# Patient Record
Sex: FEMALE | Race: WHITE | NOT HISPANIC OR LATINO | Employment: OTHER | URBAN - METROPOLITAN AREA
[De-identification: names, ages, dates, MRNs, and addresses within clinical notes are randomized per-mention and may not be internally consistent; named-entity substitution may affect disease eponyms.]

---

## 2023-02-11 PROBLEM — H65.03 ACUTE SEROUS OTITIS MEDIA OF BOTH EARS: Status: ACTIVE | Noted: 2023-02-11

## 2023-02-11 PROBLEM — M54.16 LUMBAR RADICULOPATHY: Status: ACTIVE | Noted: 2023-02-11

## 2023-02-11 PROBLEM — N32.9 BLADDER PROBLEM: Status: ACTIVE | Noted: 2023-02-11

## 2023-02-11 PROBLEM — R91.8 LUNG MASS: Status: ACTIVE | Noted: 2023-02-11

## 2023-02-11 PROBLEM — Z96.659 POSTOPERATIVE STIFFNESS OF TOTAL KNEE REPLACEMENT (CMS-HCC): Status: ACTIVE | Noted: 2023-02-11

## 2023-02-11 PROBLEM — J20.9 ACUTE BRONCHITIS: Status: ACTIVE | Noted: 2023-02-11

## 2023-02-11 PROBLEM — J30.9 ALLERGIC RHINITIS: Status: ACTIVE | Noted: 2023-02-11

## 2023-02-11 PROBLEM — M54.12 CERVICAL NEURITIS: Status: ACTIVE | Noted: 2023-02-11

## 2023-02-11 PROBLEM — M25.473 ANKLE EDEMA: Status: ACTIVE | Noted: 2023-02-11

## 2023-02-11 PROBLEM — M48.00 SPINAL STENOSIS: Status: ACTIVE | Noted: 2023-02-11

## 2023-02-11 PROBLEM — J06.9 ACUTE UPPER RESPIRATORY INFECTION: Status: ACTIVE | Noted: 2023-02-11

## 2023-02-11 PROBLEM — R09.81 NASAL CONGESTION: Status: ACTIVE | Noted: 2023-02-11

## 2023-02-11 PROBLEM — E06.9 THYROIDITIS: Status: ACTIVE | Noted: 2023-02-11

## 2023-02-11 PROBLEM — M25.669 POSTOPERATIVE STIFFNESS OF TOTAL KNEE REPLACEMENT (CMS-HCC): Status: ACTIVE | Noted: 2023-02-11

## 2023-02-11 PROBLEM — M71.20 BAKER'S CYST OF KNEE: Status: ACTIVE | Noted: 2023-02-11

## 2023-02-11 PROBLEM — J32.9 CHRONIC SINUSITIS: Status: ACTIVE | Noted: 2023-02-11

## 2023-02-11 PROBLEM — M85.80 OSTEOPENIA: Status: ACTIVE | Noted: 2023-02-11

## 2023-02-11 PROBLEM — J32.4 CHRONIC PANSINUSITIS: Status: ACTIVE | Noted: 2023-02-11

## 2023-02-11 PROBLEM — R19.5 LOOSE STOOLS: Status: ACTIVE | Noted: 2023-02-11

## 2023-02-11 PROBLEM — K59.09 CHRONIC CONSTIPATION: Status: ACTIVE | Noted: 2023-02-11

## 2023-02-11 PROBLEM — K64.4 EXTERNAL HEMORRHOIDS: Status: ACTIVE | Noted: 2023-02-11

## 2023-02-11 PROBLEM — R19.4 CHANGE IN BOWEL HABIT: Status: ACTIVE | Noted: 2023-02-11

## 2023-02-11 PROBLEM — M19.90 ARTHRITIS: Status: ACTIVE | Noted: 2023-02-11

## 2023-02-11 PROBLEM — L03.115 BILATERAL LOWER LEG CELLULITIS: Status: ACTIVE | Noted: 2023-02-11

## 2023-02-11 PROBLEM — R10.9 ABDOMINAL PAIN: Status: ACTIVE | Noted: 2023-02-11

## 2023-02-11 PROBLEM — K52.9 CHRONIC DIARRHEA: Status: ACTIVE | Noted: 2023-02-11

## 2023-02-11 PROBLEM — M48.02 CERVICAL STENOSIS OF SPINE: Status: ACTIVE | Noted: 2023-02-11

## 2023-02-11 PROBLEM — M77.12 EPICONDYLITIS, LATERAL, LEFT: Status: ACTIVE | Noted: 2023-02-11

## 2023-02-11 PROBLEM — S16.1XXA CERVICAL STRAIN: Status: ACTIVE | Noted: 2023-02-11

## 2023-02-11 PROBLEM — J31.0 CHRONIC RHINITIS: Status: ACTIVE | Noted: 2023-02-11

## 2023-02-11 PROBLEM — M77.8 DELTOID TENDINITIS, RIGHT: Status: ACTIVE | Noted: 2023-02-11

## 2023-02-11 PROBLEM — R35.0 INCREASED URINARY FREQUENCY: Status: ACTIVE | Noted: 2023-02-11

## 2023-02-11 PROBLEM — M54.9 BACK ARTHRALGIA: Status: ACTIVE | Noted: 2023-02-11

## 2023-02-11 PROBLEM — M77.8 SHOULDER CAPSULITIS, LEFT: Status: ACTIVE | Noted: 2023-02-11

## 2023-02-11 PROBLEM — R00.2 PALPITATIONS: Status: ACTIVE | Noted: 2023-02-11

## 2023-02-11 PROBLEM — D75.839 THROMBOCYTOSIS: Status: ACTIVE | Noted: 2023-02-11

## 2023-02-11 PROBLEM — R25.2 BILATERAL LEG CRAMPS: Status: ACTIVE | Noted: 2023-02-11

## 2023-02-11 PROBLEM — M96.1 POSTLAMINECTOMY SYNDROME, LUMBAR: Status: ACTIVE | Noted: 2023-02-11

## 2023-02-11 PROBLEM — M77.42 METATARSALGIA OF LEFT FOOT: Status: ACTIVE | Noted: 2023-02-11

## 2023-02-11 PROBLEM — S29.019A THORACIC MYOFASCIAL STRAIN: Status: ACTIVE | Noted: 2023-02-11

## 2023-02-11 PROBLEM — S80.11XA HEMATOMA OF RIGHT LOWER LEG: Status: ACTIVE | Noted: 2023-02-11

## 2023-02-11 PROBLEM — M54.50 LOW BACK PAIN: Status: ACTIVE | Noted: 2023-02-11

## 2023-02-11 PROBLEM — E66.9 OBESITY: Status: ACTIVE | Noted: 2023-02-11

## 2023-02-11 PROBLEM — J45.991 COUGH VARIANT ASTHMA (HHS-HCC): Status: ACTIVE | Noted: 2023-02-11

## 2023-02-11 PROBLEM — M47.22 OSTEOARTHRITIS OF SPINE WITH RADICULOPATHY, CERVICAL REGION: Status: ACTIVE | Noted: 2023-02-11

## 2023-02-11 PROBLEM — K52.9 GASTROENTERITIS: Status: ACTIVE | Noted: 2023-02-11

## 2023-02-11 PROBLEM — J41.0 SIMPLE CHRONIC BRONCHITIS (MULTI): Status: ACTIVE | Noted: 2023-02-11

## 2023-02-11 PROBLEM — M48.07 LUMBOSACRAL STENOSIS: Status: ACTIVE | Noted: 2023-02-11

## 2023-02-11 PROBLEM — R51.9 HEADACHE: Status: ACTIVE | Noted: 2023-02-11

## 2023-02-11 PROBLEM — J42 CHRONIC BRONCHITIS (MULTI): Status: ACTIVE | Noted: 2023-02-11

## 2023-02-11 PROBLEM — M54.12 CERVICAL RADICULOPATHY AT C6: Status: ACTIVE | Noted: 2023-02-11

## 2023-02-11 PROBLEM — M43.10 DEGENERATIVE SPONDYLOLISTHESIS: Status: ACTIVE | Noted: 2023-02-11

## 2023-02-11 PROBLEM — N64.89 BREAST ASYMMETRY IN FEMALE: Status: ACTIVE | Noted: 2023-02-11

## 2023-02-11 PROBLEM — M48.062 LUMBAR STENOSIS WITH NEUROGENIC CLAUDICATION: Status: ACTIVE | Noted: 2023-02-11

## 2023-02-11 PROBLEM — R19.8 ALTERNATING CONSTIPATION AND DIARRHEA: Status: ACTIVE | Noted: 2023-02-11

## 2023-02-11 PROBLEM — J45.909 ASTHMATIC BRONCHITIS (HHS-HCC): Status: ACTIVE | Noted: 2023-02-11

## 2023-02-11 PROBLEM — M19.012 PRIMARY OSTEOARTHRITIS OF LEFT SHOULDER: Status: ACTIVE | Noted: 2023-02-11

## 2023-02-11 PROBLEM — L03.116 BILATERAL LOWER LEG CELLULITIS: Status: ACTIVE | Noted: 2023-02-11

## 2023-02-11 PROBLEM — R04.0 EPISTAXIS: Status: ACTIVE | Noted: 2023-02-11

## 2023-02-11 PROBLEM — M19.011 DJD OF RIGHT SHOULDER: Status: ACTIVE | Noted: 2023-02-11

## 2023-02-11 PROBLEM — R60.0 EDEMA OF LOWER EXTREMITY: Status: ACTIVE | Noted: 2023-02-11

## 2023-02-11 PROBLEM — S70.01XA CONTUSION OF RIGHT HIP: Status: ACTIVE | Noted: 2023-02-11

## 2023-02-11 PROBLEM — M13.0 POLYARTHRITIS: Status: ACTIVE | Noted: 2023-02-11

## 2023-02-11 PROBLEM — D49.89 NEOPLASM OF BACK: Status: ACTIVE | Noted: 2023-02-11

## 2023-02-11 PROBLEM — N32.81 OVERACTIVE BLADDER: Status: ACTIVE | Noted: 2023-02-11

## 2023-02-11 PROBLEM — M94.0 COSTOCHONDRITIS: Status: ACTIVE | Noted: 2023-02-11

## 2023-02-11 PROBLEM — R53.83 FATIGUE: Status: ACTIVE | Noted: 2023-02-11

## 2023-02-11 PROBLEM — R93.89 ABNORMAL CHEST XRAY: Status: ACTIVE | Noted: 2023-02-11

## 2023-02-11 PROBLEM — G95.9 CERVICAL MYELOPATHY (MULTI): Status: ACTIVE | Noted: 2023-02-11

## 2023-02-11 PROBLEM — J45.909 ALLERGIC BRONCHITIS (HHS-HCC): Status: ACTIVE | Noted: 2023-02-11

## 2023-02-11 PROBLEM — E67.3 HIGH VITAMIN D LEVEL: Status: ACTIVE | Noted: 2023-02-11

## 2023-02-11 PROBLEM — Z98.1 STATUS POST LUMBAR SPINAL FUSION: Status: ACTIVE | Noted: 2023-02-11

## 2023-02-11 PROBLEM — M47.816 FACET DEGENERATION OF LUMBAR REGION: Status: ACTIVE | Noted: 2023-02-11

## 2023-02-11 PROBLEM — S90.31XA CONTUSION OF RIGHT FOOT: Status: ACTIVE | Noted: 2023-02-11

## 2023-02-11 PROBLEM — E78.5 HYPERLIPIDEMIA: Status: ACTIVE | Noted: 2023-02-11

## 2023-02-11 PROBLEM — T84.89XA POSTOPERATIVE STIFFNESS OF TOTAL KNEE REPLACEMENT (CMS-HCC): Status: ACTIVE | Noted: 2023-02-11

## 2023-02-11 PROBLEM — M54.10 CHRONIC RADICULAR PAIN OF LOWER EXTREMITY: Status: ACTIVE | Noted: 2023-02-11

## 2023-02-11 PROBLEM — L03.90 CELLULITIS: Status: ACTIVE | Noted: 2023-02-11

## 2023-02-11 PROBLEM — G89.29 CHRONIC RADICULAR PAIN OF LOWER EXTREMITY: Status: ACTIVE | Noted: 2023-02-11

## 2023-02-11 PROBLEM — R91.8 LUNG NODULE, MULTIPLE: Status: ACTIVE | Noted: 2023-02-11

## 2023-02-11 PROBLEM — G62.9 PERIPHERAL NEUROPATHY: Status: ACTIVE | Noted: 2023-02-11

## 2023-02-11 PROBLEM — M72.2 PLANTAR FASCIITIS: Status: ACTIVE | Noted: 2023-02-11

## 2023-02-11 PROBLEM — H26.9 BILATERAL CATARACTS: Status: ACTIVE | Noted: 2023-02-11

## 2023-02-11 PROBLEM — N88.2 CERVICAL STENOSIS (UTERINE CERVIX): Status: ACTIVE | Noted: 2023-02-11

## 2023-02-11 PROBLEM — M25.462 KNEE EFFUSION, LEFT: Status: ACTIVE | Noted: 2023-02-11

## 2023-02-11 PROBLEM — R09.89 THROAT CLEARING: Status: ACTIVE | Noted: 2023-02-11

## 2023-02-11 PROBLEM — M25.561 ARTHRALGIA OF KNEE, RIGHT: Status: ACTIVE | Noted: 2023-02-11

## 2023-02-11 PROBLEM — R09.82 POST-NASAL DRAINAGE: Status: ACTIVE | Noted: 2023-02-11

## 2023-02-11 PROBLEM — R07.89 ATYPICAL CHEST PAIN: Status: ACTIVE | Noted: 2023-02-11

## 2023-02-11 PROBLEM — M12.9 ARTHROPATHY OF MULTIPLE SITES: Status: ACTIVE | Noted: 2023-02-11

## 2023-02-11 PROBLEM — K58.0 IRRITABLE BOWEL SYNDROME WITH DIARRHEA: Status: ACTIVE | Noted: 2023-02-11

## 2023-02-11 PROBLEM — M62.81 MUSCLE WEAKNESS: Status: ACTIVE | Noted: 2023-02-11

## 2023-02-11 PROBLEM — R05.9 COUGH: Status: ACTIVE | Noted: 2023-02-11

## 2023-02-11 RX ORDER — DICLOFENAC SODIUM AND MISOPROSTOL 75; 200 MG/1; UG/1
1 TABLET, DELAYED RELEASE ORAL 2 TIMES DAILY
COMMUNITY
Start: 2013-10-17 | End: 2023-03-10 | Stop reason: SDUPTHER

## 2023-02-11 RX ORDER — MULTIVIT-MIN/FA/LYCOPEN/LUTEIN .4-300-25
1 TABLET ORAL DAILY
COMMUNITY

## 2023-02-11 RX ORDER — ALBUTEROL SULFATE 0.83 MG/ML
SOLUTION RESPIRATORY (INHALATION)
COMMUNITY
Start: 2020-12-02

## 2023-02-11 RX ORDER — GABAPENTIN 300 MG/1
300 CAPSULE ORAL EVERY 8 HOURS
COMMUNITY
Start: 2019-01-29 | End: 2023-11-21 | Stop reason: SDUPTHER

## 2023-02-11 RX ORDER — HYDROCORTISONE 25 MG/G
OINTMENT TOPICAL
COMMUNITY
Start: 2022-07-21

## 2023-02-11 RX ORDER — TRAMADOL HYDROCHLORIDE 50 MG/1
50 TABLET ORAL EVERY 6 HOURS
COMMUNITY
Start: 2018-11-28 | End: 2023-10-05 | Stop reason: SDUPTHER

## 2023-02-11 RX ORDER — MIRABEGRON 50 MG/1
TABLET, EXTENDED RELEASE ORAL
COMMUNITY
End: 2023-05-24

## 2023-02-11 RX ORDER — ALBUTEROL SULFATE 90 UG/1
AEROSOL, METERED RESPIRATORY (INHALATION)
COMMUNITY
Start: 2022-01-03

## 2023-02-11 RX ORDER — BUDESONIDE 0.5 MG/2ML
INHALANT ORAL
COMMUNITY
Start: 2022-01-06 | End: 2023-12-13 | Stop reason: SDUPTHER

## 2023-02-11 RX ORDER — KETOCONAZOLE 20 MG/ML
SHAMPOO, SUSPENSION TOPICAL
COMMUNITY
Start: 2022-07-21 | End: 2024-03-12 | Stop reason: WASHOUT

## 2023-02-11 RX ORDER — ROSUVASTATIN CALCIUM 20 MG/1
1 TABLET, COATED ORAL DAILY
COMMUNITY
Start: 2020-12-17 | End: 2023-05-24 | Stop reason: SDUPTHER

## 2023-02-11 RX ORDER — PANTOPRAZOLE SODIUM 40 MG/1
TABLET, DELAYED RELEASE ORAL
COMMUNITY
Start: 2022-07-08 | End: 2023-09-27 | Stop reason: SDUPTHER

## 2023-03-10 RX ORDER — LIDOCAINE 560 MG/1
1 PATCH PERCUTANEOUS; TOPICAL; TRANSDERMAL DAILY
COMMUNITY
End: 2023-12-04 | Stop reason: SDUPTHER

## 2023-03-10 RX ORDER — TIZANIDINE 4 MG/1
4 TABLET ORAL EVERY 6 HOURS PRN
COMMUNITY

## 2023-03-10 RX ORDER — DICLOFENAC SODIUM AND MISOPROSTOL 75; 200 MG/1; UG/1
1 TABLET, DELAYED RELEASE ORAL 2 TIMES DAILY
COMMUNITY
End: 2023-05-31 | Stop reason: SDUPTHER

## 2023-03-10 RX ORDER — ASPIRIN 81 MG/1
1 TABLET ORAL DAILY
COMMUNITY
End: 2023-05-22 | Stop reason: SDUPTHER

## 2023-03-14 ENCOUNTER — APPOINTMENT (OUTPATIENT)
Dept: PRIMARY CARE | Facility: CLINIC | Age: 73
End: 2023-03-14
Payer: MEDICARE

## 2023-03-20 ENCOUNTER — OFFICE VISIT (OUTPATIENT)
Dept: PRIMARY CARE | Facility: CLINIC | Age: 73
End: 2023-03-20
Payer: MEDICARE

## 2023-03-20 VITALS
OXYGEN SATURATION: 93 % | DIASTOLIC BLOOD PRESSURE: 71 MMHG | HEART RATE: 92 BPM | WEIGHT: 241 LBS | BODY MASS INDEX: 36.53 KG/M2 | SYSTOLIC BLOOD PRESSURE: 162 MMHG | RESPIRATION RATE: 18 BRPM | HEIGHT: 68 IN | TEMPERATURE: 97.9 F

## 2023-03-20 DIAGNOSIS — I10 PRIMARY HYPERTENSION: ICD-10-CM

## 2023-03-20 DIAGNOSIS — J45.991 COUGH VARIANT ASTHMA (HHS-HCC): ICD-10-CM

## 2023-03-20 DIAGNOSIS — E87.6 HYPOKALEMIA: ICD-10-CM

## 2023-03-20 DIAGNOSIS — N32.81 OVERACTIVE BLADDER: ICD-10-CM

## 2023-03-20 DIAGNOSIS — M96.1 POSTLAMINECTOMY SYNDROME, LUMBAR: ICD-10-CM

## 2023-03-20 DIAGNOSIS — M54.16 LUMBAR RADICULOPATHY: ICD-10-CM

## 2023-03-20 DIAGNOSIS — R60.0 BILATERAL LEG EDEMA: Primary | ICD-10-CM

## 2023-03-20 DIAGNOSIS — M25.473 ANKLE EDEMA: ICD-10-CM

## 2023-03-20 DIAGNOSIS — M12.9 ARTHROPATHY OF MULTIPLE SITES: ICD-10-CM

## 2023-03-20 PROCEDURE — 3077F SYST BP >= 140 MM HG: CPT | Performed by: FAMILY MEDICINE

## 2023-03-20 PROCEDURE — 3008F BODY MASS INDEX DOCD: CPT | Performed by: FAMILY MEDICINE

## 2023-03-20 PROCEDURE — 1159F MED LIST DOCD IN RCRD: CPT | Performed by: FAMILY MEDICINE

## 2023-03-20 PROCEDURE — 1160F RVW MEDS BY RX/DR IN RCRD: CPT | Performed by: FAMILY MEDICINE

## 2023-03-20 PROCEDURE — 3078F DIAST BP <80 MM HG: CPT | Performed by: FAMILY MEDICINE

## 2023-03-20 PROCEDURE — 1036F TOBACCO NON-USER: CPT | Performed by: FAMILY MEDICINE

## 2023-03-20 PROCEDURE — 99214 OFFICE O/P EST MOD 30 MIN: CPT | Performed by: FAMILY MEDICINE

## 2023-03-20 RX ORDER — POTASSIUM CHLORIDE 750 MG/1
10 TABLET, FILM COATED, EXTENDED RELEASE ORAL 2 TIMES DAILY
Qty: 60 TABLET | Refills: 3 | Status: SHIPPED | OUTPATIENT
Start: 2023-03-20 | End: 2023-06-19

## 2023-03-20 RX ORDER — FUROSEMIDE 20 MG/1
20 TABLET ORAL 2 TIMES DAILY
Qty: 60 TABLET | Refills: 11 | Status: SHIPPED | OUTPATIENT
Start: 2023-03-20 | End: 2024-03-19

## 2023-03-20 RX ORDER — MULTIVIT-MIN/IRON FUM/FOLIC AC 7.5 MG-4
1 TABLET ORAL DAILY
COMMUNITY

## 2023-03-20 RX ORDER — ACETAMINOPHEN 500 MG
TABLET ORAL
COMMUNITY

## 2023-03-20 ASSESSMENT — PAIN SCALES - GENERAL: PAINLEVEL: 6

## 2023-03-20 NOTE — PROGRESS NOTES
Subjective   Lizbet Beltrán is a 72 y.o. female who presents for Leg Swelling (Saw Dr. Valles for same problem).    HPI    patient is a 72-year-old female who is being evaluated for bilateral leg swelling and several other problems and concerns.  She is scheduled to have gel shots in her right knee on April 13 and she is afraid that they will do the shots if her legs are swollen.  She is running out of her diuretic and will need a refill on furosemide 20 mg to take twice daily.  She also will need some potassium supplement 10 mEq to take twice daily.  I was going to give her effervescent potassium but it is not located in the EMR.  The smaller 10 mEq pills can be taken twice daily without much difficulty.  She also has support stockings at home that she can wear and she knows to keep her legs elevated.  Patient is going to physical therapy currently in [place] and she is trying to rehab from a recent cervical spine surgery and also lumbosacral disc disease.  Patient had her left total knee done about a year and a half ago and is ambulating much better.  She is with her  and is using a wheeled walker.      Objective ROS ; 10 systems were reviewed and the information is included in the HPI and no additional review of systems is indicated.  Patient is stable with her other arthritic problems, stable with her hypertension, stable from lumbar spine surgery and cervical laminectomy.    Physical Exam  Vitals and nursing note reviewed.   Constitutional:       Appearance: Normal appearance. She is normal weight. She is not ill-appearing.   HENT:      Head: Normocephalic.      Right Ear: Tympanic membrane and external ear normal.      Left Ear: Tympanic membrane and external ear normal.      Nose: Nose normal.      Mouth/Throat:      Mouth: Mucous membranes are dry.      Pharynx: Oropharynx is clear.   Eyes:      Extraocular Movements: Extraocular movements intact.      Conjunctiva/sclera: Conjunctivae normal.       Pupils: Pupils are equal, round, and reactive to light.   Neck:      Comments: Occasional neck spasm and restriction of motion secondary to stress and tension.  Cardiovascular:      Rate and Rhythm: Normal rate and regular rhythm.      Heart sounds: Normal heart sounds.      Comments: Heart rhythm is stable S1 and S2 are noted, no ectopics.  Bilateral leg and ankle edema, no calf tenderness.  She is wearing her support stockings at home.  I will increase her Lasix to 20 twice daily with potassium supplementation.  Pulmonary:      Effort: Pulmonary effort is normal.      Comments: Asthma has been stable and she is using her home nebulizer machine.  Lungs are clear to auscultation.  Abdominal:      General: Abdomen is flat. Bowel sounds are normal.      Palpations: Abdomen is soft.      Comments: Abdomen is soft and obese, but nontender, no hepatosplenomegaly.   Genitourinary:     Comments: Not examined  Musculoskeletal:         General: Normal range of motion.      Cervical back: Normal range of motion and neck supple. No rigidity.      Comments: Previous lumbar spine surgery and recent cervical spine surgery and is improving well.  She is in physical therapy.  She also had a repeat left total knee done about a year and a half ago.  She needs gel shots in the right knee but is improving very well.  She does have bilateral lower leg and ankle edema but that is also improving.  No calf tenderness no signs of DVT.   Skin:     General: Skin is warm.      Findings: Lesion: Patient is seeing dermatology for skin check , and he has 4 or 5 nevus type skin lesions that do not look cancerous..      Comments: Patient does have some venous insufficiency in the lower legs but no skin changes or ulcerations.   Neurological:      General: No focal deficit present.      Mental Status: She is alert and oriented to person, place, and time. Mental status is at baseline.      Comments: Patient does take gabapentin for neurosensory  problems in the lower extremities.  Also diminished reflexes in the lower legs.  Improved range of motion both shoulders and arms since the cervical spine surgery.   Psychiatric:         Mood and Affect: Mood normal.         Behavior: Behavior normal.         Thought Content: Thought content normal.         Judgment: Judgment normal.      Comments: Patient has anxiety since she had 3 surgeries in a year and a half.  Major replacement of a previous left total knee, lumbar spine surgery and cervical disc surgery.  She is improving and doing very well mentally and physically.       PLAN   patient is a 72-year-old female who was evaluated for leg swelling and she needed a refill on her diuretic and potassium.  Patient is in physical therapy and she does wear her support stockings which have helped considerably.  She has had 3 major surgeries in about a year and a half including a repeat left total knee, lumbosacral spine surgery and cervical spine surgery.  She is ambulating much better with a wheeled walker and she will continue with her physical therapy.  Patient is also having Botox injections in her bladder next week for overactive bladder problems.  Otherwise she Is doing very well and will follow up in 2 or 3  months ,or sooner if needed.    Problem List Items Addressed This Visit    None  Visit Diagnoses       Bilateral leg edema    -  Primary    Relevant Medications    furosemide (Lasix) 20 mg tablet    Hypokalemia        Relevant Medications    potassium chloride CR (Klor-Con) 10 mEq ER tablet                 Vito Ordonez DO

## 2023-05-22 RX ORDER — ASPIRIN 81 MG/1
1 TABLET ORAL DAILY
COMMUNITY
End: 2024-03-12 | Stop reason: WASHOUT

## 2023-05-22 RX ORDER — CETIRIZINE HYDROCHLORIDE 10 MG/1
10 TABLET ORAL AS NEEDED
COMMUNITY

## 2023-05-22 RX ORDER — ALBUTEROL SULFATE 0.83 MG/ML
SOLUTION RESPIRATORY (INHALATION)
COMMUNITY
Start: 2020-12-02

## 2023-05-22 RX ORDER — ANTISEPTIC SURGICAL SCRUB 0.04 MG/ML
SOLUTION TOPICAL
COMMUNITY
Start: 2022-11-27 | End: 2023-09-27 | Stop reason: ALTCHOICE

## 2023-05-22 RX ORDER — ALBUTEROL SULFATE 90 UG/1
AEROSOL, METERED RESPIRATORY (INHALATION)
COMMUNITY
Start: 2022-01-03

## 2023-05-22 RX ORDER — PREDNISONE 20 MG/1
TABLET ORAL
COMMUNITY
Start: 2022-10-26 | End: 2023-05-24 | Stop reason: ALTCHOICE

## 2023-05-22 RX ORDER — DESONIDE 0.5 MG/G
OINTMENT TOPICAL
COMMUNITY
Start: 2023-01-10

## 2023-05-22 RX ORDER — GUAIFENESIN AND DEXTROMETHORPHAN HYDROBROMIDE 10; 100 MG/5ML; MG/5ML
5 SYRUP ORAL
COMMUNITY
Start: 2023-05-03 | End: 2023-06-27 | Stop reason: ALTCHOICE

## 2023-05-22 RX ORDER — OXYCODONE HYDROCHLORIDE 5 MG/1
TABLET ORAL
COMMUNITY
Start: 2022-12-04 | End: 2023-06-27 | Stop reason: ALTCHOICE

## 2023-05-24 ENCOUNTER — OFFICE VISIT (OUTPATIENT)
Dept: PRIMARY CARE | Facility: CLINIC | Age: 73
End: 2023-05-24
Payer: MEDICARE

## 2023-05-24 VITALS
TEMPERATURE: 96.8 F | OXYGEN SATURATION: 97 % | HEIGHT: 68 IN | RESPIRATION RATE: 16 BRPM | SYSTOLIC BLOOD PRESSURE: 168 MMHG | HEART RATE: 65 BPM | WEIGHT: 234 LBS | BODY MASS INDEX: 35.46 KG/M2 | DIASTOLIC BLOOD PRESSURE: 74 MMHG

## 2023-05-24 DIAGNOSIS — M54.16 LUMBAR RADICULOPATHY: ICD-10-CM

## 2023-05-24 DIAGNOSIS — M77.8 SHOULDER CAPSULITIS, LEFT: ICD-10-CM

## 2023-05-24 DIAGNOSIS — M54.12 CERVICAL NEURITIS: Primary | ICD-10-CM

## 2023-05-24 DIAGNOSIS — Z12.31 BREAST CANCER SCREENING BY MAMMOGRAM: ICD-10-CM

## 2023-05-24 DIAGNOSIS — E55.9 VITAMIN D DEFICIENCY: ICD-10-CM

## 2023-05-24 DIAGNOSIS — R07.89 ATYPICAL CHEST PAIN: ICD-10-CM

## 2023-05-24 DIAGNOSIS — E78.5 DYSLIPIDEMIA: ICD-10-CM

## 2023-05-24 DIAGNOSIS — R53.82 CHRONIC FATIGUE: ICD-10-CM

## 2023-05-24 DIAGNOSIS — L03.90 CELLULITIS, UNSPECIFIED CELLULITIS SITE: ICD-10-CM

## 2023-05-24 DIAGNOSIS — I10 PRIMARY HYPERTENSION: ICD-10-CM

## 2023-05-24 LAB
ALANINE AMINOTRANSFERASE (SGPT) (U/L) IN SER/PLAS: 26 U/L (ref 7–45)
ALBUMIN (G/DL) IN SER/PLAS: 4.4 G/DL (ref 3.4–5)
ALKALINE PHOSPHATASE (U/L) IN SER/PLAS: 83 U/L (ref 33–136)
ANION GAP IN SER/PLAS: 16 MMOL/L (ref 10–20)
ASPARTATE AMINOTRANSFERASE (SGOT) (U/L) IN SER/PLAS: 21 U/L (ref 9–39)
BILIRUBIN TOTAL (MG/DL) IN SER/PLAS: 0.4 MG/DL (ref 0–1.2)
CALCIDIOL (25 OH VITAMIN D3) (NG/ML) IN SER/PLAS: 46 NG/ML
CALCIUM (MG/DL) IN SER/PLAS: 9.5 MG/DL (ref 8.6–10.6)
CARBON DIOXIDE, TOTAL (MMOL/L) IN SER/PLAS: 28 MMOL/L (ref 21–32)
CHLORIDE (MMOL/L) IN SER/PLAS: 101 MMOL/L (ref 98–107)
CHOLESTEROL (MG/DL) IN SER/PLAS: 198 MG/DL (ref 0–199)
CHOLESTEROL IN HDL (MG/DL) IN SER/PLAS: 82.7 MG/DL
CHOLESTEROL/HDL RATIO: 2.4
CREATININE (MG/DL) IN SER/PLAS: 0.69 MG/DL (ref 0.5–1.05)
ERYTHROCYTE DISTRIBUTION WIDTH (RATIO) BY AUTOMATED COUNT: 14.3 % (ref 11.5–14.5)
ERYTHROCYTE MEAN CORPUSCULAR HEMOGLOBIN CONCENTRATION (G/DL) BY AUTOMATED: 29.6 G/DL (ref 32–36)
ERYTHROCYTE MEAN CORPUSCULAR VOLUME (FL) BY AUTOMATED COUNT: 94 FL (ref 80–100)
ERYTHROCYTES (10*6/UL) IN BLOOD BY AUTOMATED COUNT: 4.33 X10E12/L (ref 4–5.2)
GFR FEMALE: >90 ML/MIN/1.73M2
GLUCOSE (MG/DL) IN SER/PLAS: 89 MG/DL (ref 74–99)
HEMATOCRIT (%) IN BLOOD BY AUTOMATED COUNT: 40.6 % (ref 36–46)
HEMOGLOBIN (G/DL) IN BLOOD: 12 G/DL (ref 12–16)
LDL: 96 MG/DL (ref 0–99)
LEUKOCYTES (10*3/UL) IN BLOOD BY AUTOMATED COUNT: 9 X10E9/L (ref 4.4–11.3)
NRBC (PER 100 WBCS) BY AUTOMATED COUNT: 0 /100 WBC (ref 0–0)
PLATELETS (10*3/UL) IN BLOOD AUTOMATED COUNT: 391 X10E9/L (ref 150–450)
POTASSIUM (MMOL/L) IN SER/PLAS: 4.5 MMOL/L (ref 3.5–5.3)
PROTEIN TOTAL: 7.8 G/DL (ref 6.4–8.2)
SODIUM (MMOL/L) IN SER/PLAS: 140 MMOL/L (ref 136–145)
THYROTROPIN (MIU/L) IN SER/PLAS BY DETECTION LIMIT <= 0.05 MIU/L: 1.35 MIU/L (ref 0.44–3.98)
TRIGLYCERIDE (MG/DL) IN SER/PLAS: 97 MG/DL (ref 0–149)
UREA NITROGEN (MG/DL) IN SER/PLAS: 24 MG/DL (ref 6–23)
VLDL: 19 MG/DL (ref 0–40)

## 2023-05-24 PROCEDURE — 84443 ASSAY THYROID STIM HORMONE: CPT

## 2023-05-24 PROCEDURE — 1036F TOBACCO NON-USER: CPT | Performed by: FAMILY MEDICINE

## 2023-05-24 PROCEDURE — 99214 OFFICE O/P EST MOD 30 MIN: CPT | Performed by: FAMILY MEDICINE

## 2023-05-24 PROCEDURE — 82306 VITAMIN D 25 HYDROXY: CPT

## 2023-05-24 PROCEDURE — 80053 COMPREHEN METABOLIC PANEL: CPT

## 2023-05-24 PROCEDURE — 85027 COMPLETE CBC AUTOMATED: CPT

## 2023-05-24 PROCEDURE — 80061 LIPID PANEL: CPT

## 2023-05-24 PROCEDURE — 3078F DIAST BP <80 MM HG: CPT | Performed by: FAMILY MEDICINE

## 2023-05-24 PROCEDURE — 3077F SYST BP >= 140 MM HG: CPT | Performed by: FAMILY MEDICINE

## 2023-05-24 PROCEDURE — 3008F BODY MASS INDEX DOCD: CPT | Performed by: FAMILY MEDICINE

## 2023-05-24 PROCEDURE — 1160F RVW MEDS BY RX/DR IN RCRD: CPT | Performed by: FAMILY MEDICINE

## 2023-05-24 PROCEDURE — 1159F MED LIST DOCD IN RCRD: CPT | Performed by: FAMILY MEDICINE

## 2023-05-24 RX ORDER — DICLOFENAC SODIUM 75 MG/1
75 TABLET, DELAYED RELEASE ORAL 2 TIMES DAILY
Qty: 180 TABLET | Refills: 3 | Status: SHIPPED | OUTPATIENT
Start: 2023-05-24 | End: 2023-06-27 | Stop reason: ALTCHOICE

## 2023-05-24 RX ORDER — CIPROFLOXACIN 500 MG/1
500 TABLET ORAL 2 TIMES DAILY
COMMUNITY
Start: 2023-03-31 | End: 2023-12-22 | Stop reason: ALTCHOICE

## 2023-05-24 RX ORDER — GUAIFENESIN, PSEUDOEPHEDRINE HYDROCHLORIDE 600; 60 MG/1; MG/1
1 TABLET, EXTENDED RELEASE ORAL EVERY 12 HOURS
COMMUNITY
End: 2023-06-27 | Stop reason: ALTCHOICE

## 2023-05-24 RX ORDER — FLUCONAZOLE 150 MG/1
TABLET ORAL
COMMUNITY
Start: 2023-04-25

## 2023-05-24 RX ORDER — ADHESIVE BANDAGE
BANDAGE TOPICAL DAILY PRN
COMMUNITY

## 2023-05-24 RX ORDER — ROSUVASTATIN CALCIUM 20 MG/1
20 TABLET, COATED ORAL DAILY
Qty: 90 TABLET | Refills: 3 | Status: SHIPPED | OUTPATIENT
Start: 2023-05-24 | End: 2024-06-05

## 2023-05-24 RX ORDER — DICLOFENAC SODIUM 75 MG/1
75 TABLET, DELAYED RELEASE ORAL 2 TIMES DAILY
COMMUNITY
End: 2023-05-24 | Stop reason: ALTCHOICE

## 2023-05-24 RX ORDER — PREDNISONE 10 MG/1
10 TABLET ORAL 2 TIMES DAILY
Qty: 10 TABLET | Refills: 0 | Status: SHIPPED | OUTPATIENT
Start: 2023-05-24 | End: 2023-05-29

## 2023-05-24 ASSESSMENT — PAIN SCALES - GENERAL: PAINLEVEL: 8

## 2023-05-24 NOTE — PROGRESS NOTES
Subjective   Lizbet Beltrán is a 72 y.o. female who presents for Follow-up (Pt reports neck pain and wants to have DrDipika Check swelling on both legs and is requesting mammogram.).     HPI  ; patient is a 72-year-old female who has had several different surgeries over the past year and is still recovering.  She had lumbar spine surgery and was having bladder problems after the surgery and recently had Botox injection into her bladder which did help for a few weeks but now she is having bladder issues again.  She also had cervical spine surgery and had to go off all her medications due to the Botox in the bladder and now she is having left-sided neck pain and spasm with some radiation to the left shoulder.  She also needs her blood work rechecked today and has a list of questions regarding her current medical problems.  Patient usually swims in the summer and will be very helpful to her overall rehab.  Which should help strengthen her lower back and her legs since she also had a left knee replacement.  Patient is accompanied with her .  She also needs a referral for mammogram.      Objective : ROS :10 systems were reviewed and the information is included in the HPI and no additional review of systems is indicated.    Physical Exam  Vitals and nursing note reviewed.   Constitutional:       Appearance: Normal appearance. She is obese.      Comments: Patient is alert and oriented but has to be cautious with her ambulation due to several recent surgeries.   HENT:      Head: Normocephalic.      Right Ear: Tympanic membrane and ear canal normal.      Left Ear: Tympanic membrane and ear canal normal.      Nose: Nose normal.      Mouth/Throat:      Mouth: Mucous membranes are moist.      Pharynx: Oropharynx is clear.      Comments: Mouth is moist, tongue is midline, no posterior pharyngeal erythema.  Eyes:      Extraocular Movements: Extraocular movements intact.      Conjunctiva/sclera: Conjunctivae normal.       Pupils: Pupils are equal, round, and reactive to light.      Comments: Denies any visual disturbance.  Does have a yearly eye exam.   Neck:      Comments: Currently having left-sided neck pain and spasm with restriction of motion to rotation.  She also has some radiation of pain to the left shoulder and did see orthopedics last week for her shoulder.  Had recent cervical spine surgery and recovered well.  She will apply some Lidoderm patches and take a tapering dose of steroids.  Cardiovascular:      Rate and Rhythm: Normal rate and regular rhythm.      Heart sounds: Normal heart sounds.      Comments: Patient denies chest pain or palpitations.   Heart rhythm is stable S1 and S2 are noted.  No murmurs and no ectopics.  Pulmonary:      Comments: Patient does have a history of asthmatic bronchitis but currently is stable and does do home nebulizer treatments.  Lungs are clear to auscultation today.  Abdominal:      General: Bowel sounds are normal.      Palpations: Abdomen is soft.      Comments: Soft and denies flank pain.  Mildly obese, patient denies any abdominal pain, no guarding and no rebound.   Genitourinary:     Comments: Follows with urology due to overactive bladder problems and recently had an injection of Botox.  It has helped but it seems to be wearing off now.  She does follow with urology.  Musculoskeletal:         General: Tenderness present.      Cervical back: Rigidity and tenderness present.      Right lower leg: Edema present.      Left lower leg: Edema present.      Comments: Previous lumbar spine surgery x2.  She also has a spinal cord stimulator.  Patient also had cervical spine surgery this year and currently has some neck spasm with radiation to the shoulder on the left.  She had a repeat knee replacement on the left knee and is feeling well.  She probably needs her right knee done in the near future.  She has been through extensive physical therapy.  There is mild ankle edema which does  respond to Lasix.   Skin:     General: Skin is dry.      Comments: Skin is dry and patient does follow with dermatology.   Neurological:      Mental Status: She is alert.      Sensory: Sensory deficit present.      Coordination: Coordination abnormal.      Comments: Patient does have some peripheral neuropathy from prior to spinal surgeries.  Has to be very cautious with her ambulation.  Diminished reflexes upper and lower extremity.  No focal neurologic signs noted.   Psychiatric:         Behavior: Behavior normal.         Thought Content: Thought content normal.         Judgment: Judgment normal.      Comments: Patient does have some anxiety since she has been through several surgeries recently and still has some problems.  Her thought content and judgment are all normal and she does not seem depressed.  She is always optimistic about improving her health issues.     PLAN : Patient denies patient is a 72-year-old female who is evaluated today for several different problems and concerns.  She also will have her blood work drawn and she will be notified of the results in 4 days.  Patient recently received Botox injections in her bladder and it did help her overactive bladder for about 4 weeks but now it seems to be wearing off.  She will continue to follow with urology.  She currently has some left neck to shoulder pain and spasm and I did prescribe a tapering dose of prednisone and she will try to put some moist warm compresses on the area.  She will start swimming pretty soon when the weather cooperates had should help her low back problem and also help her  Rehabilitation on her legs.  Patient otherwise seems to be slowly improving from all her recent surgeries and she will follow-up in 4 months, and further recommendations will be made after review of her blood results.    Problem List Items Addressed This Visit          Nervous    Cervical neuritis - Primary    Lumbar radiculopathy    Relevant Orders    CBC     Comprehensive Metabolic Panel    Lipid Panel    Thyroid Stimulating Hormone       Circulatory    Hypertension    Relevant Orders    CBC    Comprehensive Metabolic Panel    Lipid Panel    Thyroid Stimulating Hormone       Musculoskeletal    Shoulder capsulitis, left       Infectious/Inflammatory    Cellulitis    Relevant Orders    CBC    Comprehensive Metabolic Panel    Lipid Panel    Thyroid Stimulating Hormone       Other    Atypical chest pain    Relevant Orders    CBC    Comprehensive Metabolic Panel    Lipid Panel    Thyroid Stimulating Hormone    Fatigue    Relevant Orders    CBC    Comprehensive Metabolic Panel    Lipid Panel    Thyroid Stimulating Hormone     Other Visit Diagnoses       Vitamin D deficiency        Relevant Orders    Vitamin D, Total                 Vito Ordonez DO

## 2023-05-28 NOTE — RESULT ENCOUNTER NOTE
Blood sugar and kidney and liver function are normal        red and white blood cell counts are stable       the anemia is better at 12.0    thyroid function is stable    vitamin D is normal          cholesterol is normal at 198        Triglycerides are normal at 97 point blood work looks stable        Keep up the good work

## 2023-05-30 ENCOUNTER — TELEPHONE (OUTPATIENT)
Dept: PRIMARY CARE | Facility: CLINIC | Age: 73
End: 2023-05-30
Payer: MEDICARE

## 2023-05-30 NOTE — TELEPHONE ENCOUNTER
----- Message from Vito Ordonez DO sent at 5/27/2023  8:35 PM EDT -----  Blood sugar and kidney and liver function are normal        red and white blood cell counts are stable       the anemia is better at 12.0    thyroid function is stable    vitamin D is normal          cholesterol is normal at 198        Triglycerides are normal at 97 point blood work looks stable        Keep up the good work

## 2023-05-31 DIAGNOSIS — B35.3 INFECTION, FUNGAL, RIGHT FOOT: ICD-10-CM

## 2023-05-31 DIAGNOSIS — M19.90 ARTHRITIS: Primary | ICD-10-CM

## 2023-05-31 RX ORDER — DICLOFENAC SODIUM AND MISOPROSTOL 75; 200 MG/1; UG/1
1 TABLET, DELAYED RELEASE ORAL 2 TIMES DAILY
Qty: 90 TABLET | Refills: 3 | Status: SHIPPED | OUTPATIENT
Start: 2023-05-31 | End: 2023-09-27 | Stop reason: SDUPTHER

## 2023-05-31 RX ORDER — KETOCONAZOLE 20 MG/G
CREAM TOPICAL
Qty: 30 G | Refills: 2 | Status: SHIPPED | OUTPATIENT
Start: 2023-05-31 | End: 2024-03-12 | Stop reason: WASHOUT

## 2023-06-19 DIAGNOSIS — E87.6 HYPOKALEMIA: ICD-10-CM

## 2023-06-19 RX ORDER — POTASSIUM CHLORIDE 750 MG/1
10 TABLET, FILM COATED, EXTENDED RELEASE ORAL 2 TIMES DAILY
Qty: 180 TABLET | Refills: 3 | Status: SHIPPED | OUTPATIENT
Start: 2023-06-19 | End: 2023-07-17

## 2023-06-30 ENCOUNTER — TELEPHONE (OUTPATIENT)
Dept: PRIMARY CARE | Facility: CLINIC | Age: 73
End: 2023-06-30
Payer: MEDICARE

## 2023-06-30 NOTE — TELEPHONE ENCOUNTER
----- Message from Vito Ordonez DO sent at 6/28/2023  8:40 AM EDT -----  The  Mammograms  are  stable.    She  can repeat  in one year.

## 2023-07-15 DIAGNOSIS — E87.6 HYPOKALEMIA: ICD-10-CM

## 2023-07-17 RX ORDER — POTASSIUM CHLORIDE 750 MG/1
TABLET, EXTENDED RELEASE ORAL
Qty: 60 TABLET | Refills: 0 | Status: SHIPPED | OUTPATIENT
Start: 2023-07-17

## 2023-09-07 LAB
6-ACETYLMORPHINE: <25 NG/ML
7-AMINOCLONAZEPAM: <25 NG/ML
ALPHA-HYDROXYALPRAZOLAM: <25 NG/ML
ALPHA-HYDROXYMIDAZOLAM: <25 NG/ML
ALPRAZOLAM: <25 NG/ML
AMPHETAMINE (PRESENCE) IN URINE BY SCREEN METHOD: ABNORMAL
BARBITURATES PRESENCE IN URINE BY SCREEN METHOD: ABNORMAL
CANNABINOIDS IN URINE BY SCREEN METHOD: ABNORMAL
CHLORDIAZEPOXIDE: <25 NG/ML
CLONAZEPAM: <25 NG/ML
COCAINE (PRESENCE) IN URINE BY SCREEN METHOD: ABNORMAL
CODEINE: <50 NG/ML
CREATINE, URINE FOR DRUG: 129.4 MG/DL
DIAZEPAM: <25 NG/ML
DRUG SCREEN COMMENT URINE: ABNORMAL
EDDP: <25 NG/ML
FENTANYL CONFIRMATION, URINE: <2.5 NG/ML
HYDROCODONE: <25 NG/ML
HYDROMORPHONE: <25 NG/ML
LORAZEPAM: <25 NG/ML
METHADONE CONFIRMATION,URINE: <25 NG/ML
MIDAZOLAM: <25 NG/ML
MORPHINE URINE: <50 NG/ML
NORDIAZEPAM: <25 NG/ML
NORFENTANYL: <2.5 NG/ML
NORHYDROCODONE: <25 NG/ML
NOROXYCODONE: <25 NG/ML
O-DESMETHYLTRAMADOL: >1000 NG/ML
OXAZEPAM: <25 NG/ML
OXYCODONE: <25 NG/ML
OXYMORPHONE: <25 NG/ML
PHENCYCLIDINE (PRESENCE) IN URINE BY SCREEN METHOD: ABNORMAL
TEMAZEPAM: <25 NG/ML
TRAMADOL: >1000 NG/ML
ZOLPIDEM METABOLITE (ZCA): <25 NG/ML
ZOLPIDEM: <25 NG/ML

## 2023-09-27 ENCOUNTER — TRANSCRIBE ORDERS (OUTPATIENT)
Dept: PAIN MEDICINE | Facility: CLINIC | Age: 73
End: 2023-09-27

## 2023-09-27 ENCOUNTER — OFFICE VISIT (OUTPATIENT)
Dept: PRIMARY CARE | Facility: CLINIC | Age: 73
End: 2023-09-27
Payer: MEDICARE

## 2023-09-27 VITALS
DIASTOLIC BLOOD PRESSURE: 78 MMHG | BODY MASS INDEX: 36.68 KG/M2 | WEIGHT: 242 LBS | SYSTOLIC BLOOD PRESSURE: 152 MMHG | HEIGHT: 68 IN | HEART RATE: 72 BPM | OXYGEN SATURATION: 95 % | TEMPERATURE: 97.9 F | RESPIRATION RATE: 18 BRPM

## 2023-09-27 DIAGNOSIS — R60.0 BILATERAL LEG EDEMA: Primary | ICD-10-CM

## 2023-09-27 DIAGNOSIS — K29.30 CHRONIC SUPERFICIAL GASTRITIS WITHOUT BLEEDING: Primary | ICD-10-CM

## 2023-09-27 DIAGNOSIS — G95.9 CERVICAL MYELOPATHY (MULTI): ICD-10-CM

## 2023-09-27 DIAGNOSIS — G62.9 NEUROPATHY: ICD-10-CM

## 2023-09-27 DIAGNOSIS — M19.90 ARTHRITIS: ICD-10-CM

## 2023-09-27 DIAGNOSIS — J45.40 MODERATE PERSISTENT ASTHMATIC BRONCHITIS WITHOUT COMPLICATION (HHS-HCC): ICD-10-CM

## 2023-09-27 DIAGNOSIS — M17.11 PRIMARY OSTEOARTHRITIS OF RIGHT KNEE: ICD-10-CM

## 2023-09-27 DIAGNOSIS — N32.9 BLADDER PROBLEM: ICD-10-CM

## 2023-09-27 DIAGNOSIS — M25.551 RIGHT HIP PAIN: ICD-10-CM

## 2023-09-27 DIAGNOSIS — J45.991 COUGH VARIANT ASTHMA (HHS-HCC): ICD-10-CM

## 2023-09-27 DIAGNOSIS — Z96.652 HISTORY OF TOTAL LEFT KNEE REPLACEMENT: ICD-10-CM

## 2023-09-27 PROCEDURE — 1036F TOBACCO NON-USER: CPT | Performed by: FAMILY MEDICINE

## 2023-09-27 PROCEDURE — 1160F RVW MEDS BY RX/DR IN RCRD: CPT | Performed by: FAMILY MEDICINE

## 2023-09-27 PROCEDURE — 3008F BODY MASS INDEX DOCD: CPT | Performed by: FAMILY MEDICINE

## 2023-09-27 PROCEDURE — 3077F SYST BP >= 140 MM HG: CPT | Performed by: FAMILY MEDICINE

## 2023-09-27 PROCEDURE — 3078F DIAST BP <80 MM HG: CPT | Performed by: FAMILY MEDICINE

## 2023-09-27 PROCEDURE — 1125F AMNT PAIN NOTED PAIN PRSNT: CPT | Performed by: FAMILY MEDICINE

## 2023-09-27 PROCEDURE — 99214 OFFICE O/P EST MOD 30 MIN: CPT | Performed by: FAMILY MEDICINE

## 2023-09-27 PROCEDURE — 1159F MED LIST DOCD IN RCRD: CPT | Performed by: FAMILY MEDICINE

## 2023-09-27 RX ORDER — PANTOPRAZOLE SODIUM 40 MG/1
40 TABLET, DELAYED RELEASE ORAL
Qty: 90 TABLET | Refills: 3 | Status: SHIPPED | OUTPATIENT
Start: 2023-09-27

## 2023-09-27 RX ORDER — DICLOFENAC SODIUM AND MISOPROSTOL 75; 200 MG/1; UG/1
1 TABLET, DELAYED RELEASE ORAL 2 TIMES DAILY
Qty: 180 TABLET | Refills: 3 | Status: SHIPPED | OUTPATIENT
Start: 2023-09-27 | End: 2024-01-03 | Stop reason: ALTCHOICE

## 2023-09-27 ASSESSMENT — PAIN SCALES - GENERAL: PAINLEVEL: 7

## 2023-09-27 ASSESSMENT — PATIENT HEALTH QUESTIONNAIRE - PHQ9
2. FEELING DOWN, DEPRESSED OR HOPELESS: NOT AT ALL
1. LITTLE INTEREST OR PLEASURE IN DOING THINGS: NOT AT ALL
SUM OF ALL RESPONSES TO PHQ9 QUESTIONS 1 AND 2: 0

## 2023-09-27 NOTE — PROGRESS NOTES
Subjective   Lizbet Beltrán is a 73 y.o. female who presents for Follow-up (Follow up visit, blood pressure check and medication review today).    HPI  : Patient is a 73-year-old female who has several different medical problems and concerns and is being evaluated today for follow-up and review of medication.  She has had several recent surgeries including lumbar spine surgery, a revision of a left knee replacement surgery and also cervical spine surgery.  She is having issues with her bladder and is following with urology and may need placement of a PC stimulator to help with bladder control.  Patient is in the office today with her   And she does need a refill on several medications and also wants to review current medications and her pain medicines.  She does follow with pain management and has been taking tramadol occasionally but not very often.  She usually takes Tylenol and has been applying Voltaren gel.  She also takes Arthrotec 75 mg twice daily which does seem to help.      Objective  : ROS : 10 systems were reviewed and the information is included in the HPI and no additional review of systems is indicated.    Physical Exam  Vitals and nursing note reviewed.   Constitutional:       Appearance: Normal appearance. She is obese.      Comments: Patient is alert and oriented x3.   No acute distress, but does have a lot of chronic health issues.   HENT:      Head: Normocephalic.      Right Ear: Tympanic membrane and external ear normal.      Left Ear: Tympanic membrane and external ear normal.      Ears:      Comments: Ears are patent bilaterally and TMs are clear.     Nose: Nose normal.      Mouth/Throat:      Mouth: Mucous membranes are moist.      Pharynx: Oropharynx is clear.      Comments: Mouth is moist, tongue is midline.  No posterior pharyngeal erythema.  Eyes:      Extraocular Movements: Extraocular movements intact.      Conjunctiva/sclera: Conjunctivae normal.      Pupils: Pupils are  equal, round, and reactive to light.      Comments: No visual disturbance, does have her eyes examined once a year.   Neck:      Comments: Cervical spine surgery one year ago.  No carotid bruits, no thyromegaly, no cervical adenopathy.  Restricted range of motion cervical spine due to fusion.  Cardiovascular:      Rate and Rhythm: Normal rate and regular rhythm.      Pulses: Normal pulses.      Heart sounds: Normal heart sounds.      Comments: Patient denies chest pain and no palpitations.  Heart rhythm is stable S1 and S2 are noted, no ectopics.  Pulmonary:      Effort: Pulmonary effort is normal.      Comments: Patient has a history of cough induced asthma.  She does see pulmonary medicine and does do home nebulizer treatments.  She denies any coughing or wheezing at this time and her lungs are essentially clear.  Abdominal:      General: Bowel sounds are normal.      Palpations: Abdomen is soft.      Comments: Abdomen is soft and obese and difficult to evaluate.  Patient denies abdominal pain at this time.  She does occasionally have reflux, no diarrhea.   Genitourinary:     Comments: Patient is following with urology and had Botox injected into the bladder, the neck step may be a PC stimulator to help with bladder control.  Musculoskeletal:         General: Tenderness (Patient does have right knee pain due to osteoarthritis.) present.      Right lower leg: Edema present.      Left lower leg: Edema present.      Comments: Lumbosacral spine surgery 1 1/2 years ago.  Revision of left knee 2 years ago.   And cervical spine surgery about a year ago.  Patient does have a lot of arthritis in her joints and will eventually need a right total knee replacement done.  She is not looking forward to any more surgery at this time.  She also does follow with pain management.  Mild bilateral ankle edema.  She is on diuretics.   Skin:     General: Skin is warm.      Comments: There is no bruising, no erythema, no skin lesions  noted, no rashes.   Neurological:      Mental Status: She is alert and oriented to person, place, and time. Mental status is at baseline.      Comments: Patient does have peripheral neuropathy from her lumbosacral disc disease.  She does have some unsteady gait and balance due to previous surgeries and also her revision of the left total knee replacement.  She does use a walker when she ambulates.   Psychiatric:         Behavior: Behavior normal.         Thought Content: Thought content normal.         Judgment: Judgment normal.      Comments: Patient does have anxiety concerning health problems but she feels as if she is improving.  Thought content and judgment are stable.  No signs of vascular dementia.  Behavior is normal.     PLAN : Patient is a 73-year-old female who is evaluated today for review of medication and concerns about some of her health issues.  She does continue to improve from her recent surgeries including lumbosacral spine surgery, cervical spine surgery, and revision of a left total knee surgery.  She also is seeing urology due to overactive bladder problems and is receiving Botox but may need a PC stimulator placed.  Patient needed a refill on her arthritis medications and she is ambulating with a walker and watches her balance.  During the summer she was swimming on a daily basis for therapy rehab.  Further recommendations will be made after she finishes with pain management  And she will follow-up in 3 months for recheck on her blood work.    Problem List Items Addressed This Visit    None           Vito Ordonez DO

## 2023-10-03 ENCOUNTER — TRANSCRIBE ORDERS (OUTPATIENT)
Dept: PAIN MEDICINE | Facility: CLINIC | Age: 73
End: 2023-10-03
Payer: MEDICARE

## 2023-10-03 DIAGNOSIS — M25.511 CHRONIC RIGHT SHOULDER PAIN: ICD-10-CM

## 2023-10-03 DIAGNOSIS — G89.29 CHRONIC RIGHT SHOULDER PAIN: ICD-10-CM

## 2023-10-05 ENCOUNTER — ANCILLARY PROCEDURE (OUTPATIENT)
Dept: RADIOLOGY | Facility: CLINIC | Age: 73
End: 2023-10-05
Payer: MEDICARE

## 2023-10-05 ENCOUNTER — CLINICAL SUPPORT (OUTPATIENT)
Dept: PAIN MEDICINE | Facility: CLINIC | Age: 73
End: 2023-10-05
Payer: MEDICARE

## 2023-10-05 VITALS
RESPIRATION RATE: 18 BRPM | WEIGHT: 235 LBS | HEIGHT: 68 IN | TEMPERATURE: 96.3 F | SYSTOLIC BLOOD PRESSURE: 166 MMHG | BODY MASS INDEX: 35.61 KG/M2 | HEART RATE: 76 BPM | OXYGEN SATURATION: 95 % | DIASTOLIC BLOOD PRESSURE: 69 MMHG

## 2023-10-05 DIAGNOSIS — M25.511 CHRONIC RIGHT SHOULDER PAIN: ICD-10-CM

## 2023-10-05 DIAGNOSIS — G89.29 CHRONIC RIGHT SHOULDER PAIN: ICD-10-CM

## 2023-10-05 DIAGNOSIS — M16.9 OSTEOARTHROSIS, HIP: ICD-10-CM

## 2023-10-05 DIAGNOSIS — M25.551 RIGHT HIP PAIN: ICD-10-CM

## 2023-10-05 DIAGNOSIS — Z29.9 PREVENTIVE MEDICATION THERAPY NEEDED: Primary | ICD-10-CM

## 2023-10-05 PROCEDURE — 20610 DRAIN/INJ JOINT/BURSA W/O US: CPT | Performed by: ANESTHESIOLOGY

## 2023-10-05 PROCEDURE — 2500000005 HC RX 250 GENERAL PHARMACY W/O HCPCS

## 2023-10-05 PROCEDURE — 77003 FLUOROGUIDE FOR SPINE INJECT: CPT

## 2023-10-05 PROCEDURE — 2500000001 HC RX 250 WO HCPCS SELF ADMINISTERED DRUGS (ALT 637 FOR MEDICARE OP)

## 2023-10-05 RX ORDER — TRIAMCINOLONE ACETONIDE 40 MG/ML
INJECTION, SUSPENSION INTRA-ARTICULAR; INTRAMUSCULAR
Status: COMPLETED
Start: 2023-10-05 | End: 2023-10-05

## 2023-10-05 RX ORDER — TRAMADOL HYDROCHLORIDE 50 MG/1
50 TABLET ORAL EVERY 8 HOURS PRN
Qty: 90 TABLET | Refills: 1 | Status: SHIPPED | OUTPATIENT
Start: 2023-10-05 | End: 2023-12-07 | Stop reason: SDUPTHER

## 2023-10-05 RX ORDER — CEPHALEXIN 500 MG/1
500 CAPSULE ORAL ONCE
Status: DISCONTINUED | OUTPATIENT
Start: 2023-10-05 | End: 2024-01-03

## 2023-10-05 RX ORDER — LIDOCAINE HYDROCHLORIDE 20 MG/ML
INJECTION, SOLUTION EPIDURAL; INFILTRATION; INTRACAUDAL; PERINEURAL
Status: COMPLETED
Start: 2023-10-05 | End: 2023-10-05

## 2023-10-05 RX ORDER — CEPHALEXIN 500 MG/1
CAPSULE ORAL
Status: COMPLETED
Start: 2023-10-05 | End: 2023-10-05

## 2023-10-05 RX ADMIN — LIDOCAINE HYDROCHLORIDE 100 MG: 20 INJECTION, SOLUTION EPIDURAL; INFILTRATION; INTRACAUDAL; PERINEURAL at 11:17

## 2023-10-05 RX ADMIN — CEPHALEXIN 500 MG: 500 CAPSULE ORAL at 11:14

## 2023-10-05 RX ADMIN — TRIAMCINOLONE ACETONIDE 40 MG: 40 INJECTION, SUSPENSION INTRA-ARTICULAR; INTRAMUSCULAR at 11:17

## 2023-10-05 SDOH — ECONOMIC STABILITY: FOOD INSECURITY: WITHIN THE PAST 12 MONTHS, THE FOOD YOU BOUGHT JUST DIDN'T LAST AND YOU DIDN'T HAVE MONEY TO GET MORE.: NEVER TRUE

## 2023-10-05 SDOH — ECONOMIC STABILITY: FOOD INSECURITY: WITHIN THE PAST 12 MONTHS, YOU WORRIED THAT YOUR FOOD WOULD RUN OUT BEFORE YOU GOT MONEY TO BUY MORE.: NEVER TRUE

## 2023-10-05 ASSESSMENT — COLUMBIA-SUICIDE SEVERITY RATING SCALE - C-SSRS
2. HAVE YOU ACTUALLY HAD ANY THOUGHTS OF KILLING YOURSELF?: NO
6. HAVE YOU EVER DONE ANYTHING, STARTED TO DO ANYTHING, OR PREPARED TO DO ANYTHING TO END YOUR LIFE?: NO
1. IN THE PAST MONTH, HAVE YOU WISHED YOU WERE DEAD OR WISHED YOU COULD GO TO SLEEP AND NOT WAKE UP?: NO

## 2023-10-05 ASSESSMENT — ENCOUNTER SYMPTOMS
DEPRESSION: 0
OCCASIONAL FEELINGS OF UNSTEADINESS: 1
LOSS OF SENSATION IN FEET: 0

## 2023-10-05 ASSESSMENT — PATIENT HEALTH QUESTIONNAIRE - PHQ9
1. LITTLE INTEREST OR PLEASURE IN DOING THINGS: NOT AT ALL
SUM OF ALL RESPONSES TO PHQ9 QUESTIONS 1 & 2: 0
2. FEELING DOWN, DEPRESSED OR HOPELESS: NOT AT ALL

## 2023-10-05 ASSESSMENT — LIFESTYLE VARIABLES
HOW OFTEN DO YOU HAVE A DRINK CONTAINING ALCOHOL: MONTHLY OR LESS
HOW MANY STANDARD DRINKS CONTAINING ALCOHOL DO YOU HAVE ON A TYPICAL DAY: 1 OR 2
HOW OFTEN DO YOU HAVE SIX OR MORE DRINKS ON ONE OCCASION: NEVER
AUDIT-C TOTAL SCORE: 1
SKIP TO QUESTIONS 9-10: 1

## 2023-10-05 ASSESSMENT — PAIN SCALES - GENERAL: PAINLEVEL_OUTOF10: 7

## 2023-10-05 ASSESSMENT — PAIN - FUNCTIONAL ASSESSMENT: PAIN_FUNCTIONAL_ASSESSMENT: 0-10

## 2023-10-05 ASSESSMENT — PAIN DESCRIPTION - DESCRIPTORS: DESCRIPTORS: PRESSURE

## 2023-10-05 NOTE — PROGRESS NOTES
In room: 1132  Timeout:1138  Prep:1138  Procedure start: 1141  Procedure end: 1144  Debrief:1144  Out of room: 1145    Phase II start: 1146  Phase II end: .      Surgeon: Dr. Wang  Circulator: SANDRA Vazquez RN  Monitor nurse: TESSA Zimmerman RN  X-ray: FERNANDO Munoz

## 2023-10-05 NOTE — PROGRESS NOTES
HPI: 73-year-old  female with severe right hip pain.  She has had severe difficulty walking because of the osteoarthritis which was determined on x-ray.  Risk and benefits of the procedure as well as COVID-19 was discussed and the signed consent was obtained prior to her entering the OR.    Joint Injection/Aspiration    Date/Time: 10/5/2023 11:34 AM    Performed by: Brian Wang DO  Authorized by: Brian Wang DO    Consent:     Consent obtained:  Written    Consent given by:  Patient    Risks, benefits, and alternatives were discussed: yes      Risks discussed:  Bleeding, infection and pain    Alternatives discussed:  No treatment  Universal protocol:     Procedure explained and questions answered to patient or proxy's satisfaction: yes      Relevant documents present and verified: yes      Test results available: yes      Imaging studies available: yes      Required blood products, implants, devices, and special equipment available: no      Site/side marked: yes      Immediately prior to procedure, a time out was called: yes      Patient identity confirmed:  Verbally with patient, arm band and hospital-assigned identification number  Location:     Location:  Hip    Hip:  R hip joint  Anesthesia:     Anesthesia method:  Local infiltration    Local anesthetic:  Lidocaine 2% w/o epi  Procedure details:     Preparation: Patient was prepped and draped in usual sterile fashion      Needle gauge:  22 G    Ultrasound guidance: no      Approach:  Anterior    Steroid injected: yes      Specimen collected: no    Post-procedure details:     Dressing:  Adhesive bandage    Procedure completion:  Tolerated well, no immediate complications     Patient is a 73 y.o. year-old  female here today for a right hip injection under fluoroscopy. Patient was placed in a supine position on your tableware sterile prep and drape of the right hip joint was done and sterile drapes were applied. Using a sterile 8  inch ring forceps as a pointer, I identified the superior lateral border of the femoral head and the acetabulum. 4 cc of 1% Xylocaine plain. A 25-gauge 2 inch sterile needle was used to inject into the skin and subcutaneous tissue. This was followed by placement of the sterile 3-1/2 inch 22-gauge spinal needle into the joint space. Negative aspiration was noted of fluids. There was no paresthesias noted or severe pain. 2 cc of Isovue was then injected through the spinal needle which outlined they had at the femur as well as the acetabulum. 20 mg of Kenalog was then injected +4 cc of 0.25% Marcaine plain. Spinal needle was then removed out of the joint space and a sterile bandage was applied over the puncture site. Patient tolerated the procedure well and was taken by wheelchair to recovery room in stable satisfactory condition with neurologic deficits or weakness involving the patients hip and leg.

## 2023-11-03 ENCOUNTER — TELEPHONE (OUTPATIENT)
Dept: PULMONOLOGY | Facility: HOSPITAL | Age: 73
End: 2023-11-03
Payer: MEDICARE

## 2023-11-03 NOTE — TELEPHONE ENCOUNTER
Notified patient of cancelled pulmonary appointment on 11/8/2023, requested call back for re-scheduling.

## 2023-11-08 ENCOUNTER — APPOINTMENT (OUTPATIENT)
Dept: PULMONOLOGY | Facility: CLINIC | Age: 73
End: 2023-11-08
Payer: MEDICARE

## 2023-11-20 ENCOUNTER — TELEPHONE (OUTPATIENT)
Dept: PRIMARY CARE | Facility: CLINIC | Age: 73
End: 2023-11-20
Payer: MEDICARE

## 2023-11-20 DIAGNOSIS — J01.00 ACUTE NON-RECURRENT MAXILLARY SINUSITIS: Primary | ICD-10-CM

## 2023-11-20 RX ORDER — CEPHALEXIN 500 MG/1
500 CAPSULE ORAL 2 TIMES DAILY
Qty: 20 CAPSULE | Refills: 0 | Status: SHIPPED | OUTPATIENT
Start: 2023-11-20 | End: 2023-11-30

## 2023-11-20 NOTE — TELEPHONE ENCOUNTER
"Patient calling with sinus infection symptoms since last week.  Pt. States \"he knows about this because my  needed an antibiotic and he sent it for him and he is feeling better.\"    [facility].  "

## 2023-11-21 DIAGNOSIS — Z98.890 STATUS POST LUMBAR LAMINECTOMY: ICD-10-CM

## 2023-11-21 DIAGNOSIS — Z98.890 STATUS POST LUMBAR LAMINECTOMY: Primary | ICD-10-CM

## 2023-11-21 RX ORDER — GABAPENTIN 300 MG/1
300 CAPSULE ORAL EVERY 8 HOURS
Qty: 270 CAPSULE | Refills: 1 | Status: SHIPPED | OUTPATIENT
Start: 2023-11-21 | End: 2024-03-12

## 2023-11-22 RX ORDER — GABAPENTIN 300 MG/1
300 CAPSULE ORAL EVERY 8 HOURS
Qty: 270 CAPSULE | Refills: 1 | Status: CANCELLED | OUTPATIENT
Start: 2023-11-22 | End: 2024-02-20

## 2023-11-22 RX ORDER — GABAPENTIN 300 MG/1
300 CAPSULE ORAL EVERY 8 HOURS
Status: CANCELLED | OUTPATIENT
Start: 2023-11-22

## 2023-12-01 ENCOUNTER — ANCILLARY PROCEDURE (OUTPATIENT)
Dept: RADIOLOGY | Facility: CLINIC | Age: 73
End: 2023-12-01
Payer: MEDICARE

## 2023-12-01 DIAGNOSIS — M54.2 CERVICAL PAIN: ICD-10-CM

## 2023-12-01 PROCEDURE — 72050 X-RAY EXAM NECK SPINE 4/5VWS: CPT | Mod: FY

## 2023-12-01 PROCEDURE — 72050 X-RAY EXAM NECK SPINE 4/5VWS: CPT | Performed by: RADIOLOGY

## 2023-12-04 ENCOUNTER — OFFICE VISIT (OUTPATIENT)
Dept: ORTHOPEDIC SURGERY | Facility: CLINIC | Age: 73
End: 2023-12-04
Payer: MEDICARE

## 2023-12-04 VITALS — HEIGHT: 68 IN | WEIGHT: 230 LBS | BODY MASS INDEX: 34.86 KG/M2

## 2023-12-04 DIAGNOSIS — M96.0 PSEUDARTHROSIS FOLLOWING SPINAL FUSION: ICD-10-CM

## 2023-12-04 DIAGNOSIS — M54.2 CERVICAL PAIN: ICD-10-CM

## 2023-12-04 DIAGNOSIS — G95.9 CERVICAL MYELOPATHY (MULTI): Primary | ICD-10-CM

## 2023-12-04 PROCEDURE — 1159F MED LIST DOCD IN RCRD: CPT | Performed by: ORTHOPAEDIC SURGERY

## 2023-12-04 PROCEDURE — 99213 OFFICE O/P EST LOW 20 MIN: CPT | Performed by: ORTHOPAEDIC SURGERY

## 2023-12-04 PROCEDURE — 1125F AMNT PAIN NOTED PAIN PRSNT: CPT | Performed by: ORTHOPAEDIC SURGERY

## 2023-12-04 PROCEDURE — 1160F RVW MEDS BY RX/DR IN RCRD: CPT | Performed by: ORTHOPAEDIC SURGERY

## 2023-12-04 PROCEDURE — 1036F TOBACCO NON-USER: CPT | Performed by: ORTHOPAEDIC SURGERY

## 2023-12-04 RX ORDER — LIDOCAINE 560 MG/1
1 PATCH PERCUTANEOUS; TOPICAL; TRANSDERMAL DAILY PRN
Qty: 30 PATCH | Refills: 0 | Status: SHIPPED | OUTPATIENT
Start: 2023-12-04 | End: 2024-01-03

## 2023-12-04 ASSESSMENT — PAIN DESCRIPTION - DESCRIPTORS
DESCRIPTORS: DULL
DESCRIPTORS: DULL;NUMBNESS

## 2023-12-04 ASSESSMENT — PAIN SCALES - GENERAL: PAINLEVEL_OUTOF10: 0 - NO PAIN

## 2023-12-04 ASSESSMENT — PAIN - FUNCTIONAL ASSESSMENT: PAIN_FUNCTIONAL_ASSESSMENT: 0-10

## 2023-12-04 NOTE — PROGRESS NOTES
S: Lizbet Beltrán is a 73 y.o. year old female patient who is here for her 1 year follow-up for anterior cervical discectomy and fusion between C3-C4, C4-C5 and C5-C6.  This was done with Dr. Burris on December 2, 2022.  I last saw her on June 20, 2023 and at that time she was doing very well however she noticed that over the last 2 to 3 months her myelopathic symptoms are returning she noticed that she is having more clumsiness with the use of her hands she is dropping things again more frequently than she was after her surgery.  She also noticed some difficulty with gait and seems that her balance is still not completely recovered.  And may be a little bit worse since I last saw her.  Otherwise no radicular arm pain no new numbness or weakness.  The numbness in her hand has improved she only had numbness along the thumb and the index finger now bilaterally.  Possible carpal tunnel.  No bowel or bladder changes.  She is ambulating with her wheeled walker.  She is here with her  today.    Patient does not smoke tobacco she states that she quit maybe 12 years ago.      O:     General: Patient appears well-nourished and well-developed in no acute distress, Alert and Oriented x3, obese female  Psych: Pleasant mood and affect  HEENT: Extraocular muscles intact, pupils equal and round. Sclerae anicteric   Cardio: extremities warm and well perfused  Resp: unlabored symmetric breathing  Skin: Well-healed anterior cervical incision  Musculoskeletal/Neuro Exam: Wide-based gait with a wheeled walker. . No tenderness to palpation along the cervical midline and paraspinal regions. Negative Lhermitte sign. Negative Spurling bilaterally.         Upper extremity: Mild atrophy of bilateral thenar muscles in bilateral hands.  Positive Tinel and Durkan over the carpal tunnel in the left hand     Motor: Right upper extremity was 5 out of 5 strength with shoulder abduction, elbow flexion and extension, wrist flexion and  extension and  against resistance  Left upper extremity with 5 out of 5 strength with shoulder abduction, elbow flexion and extension, wrist flexion and extension and  against resistance     Sensation to light touch intact along C5-T1 distribution bilaterally     Reflex: 2+ brachioradialis and biceps bilaterally     Upper Motor Signs: Positive Lane sign on the left     Lower extremity     Motor: Right leg with 5 out of 5 motor strength with hip flexion, knee extension, ankle dorsiflexion plantarflexion EHL against resistance  Left leg with 5 out of 5 motor strength with hip flexion, knee extension, ankle dorsiflexion plantarflexion EHL against resistance     Sensation to light touch intact along L2-S1 distribution bilaterally            Imaging:      I reviewed x-rays of the cervical spine obtained in clinic today AP lateral flexion-extension view.  She has anterior hardware with stand-alone cages between C3-C4, C4-C5, and C5-C6.  Hardware appears intact no evidence of broken hardware but there appears to be some possibly lucency between the screws in C4 and C5.        A/P: Lizbet Beltrán is a 73 y.o. year old female patient who is 1 year out status post C3-C6 anterior cervical discectomy and fusion for cervical stenosis and myelopathy with Dr. Burris on December 2022.  She was doing well up until about 2 to 3 months ago when she started to notice her myelopathic symptoms have returned.  She noticed difficulty with the use of her hands and clumsiness as well as her gait.  X-rays today there may be some questionable lucency around her screws possible pseudoarthrosis.  Given her history and findings I would like for her to get an MRI of the cervical spine to look at her spinal cord to see if it is decompressed well enough.  I also want her to get a CT scan of the cervical spine to evaluate for possible pseudoarthrosis and lucency around the screws and hardware.  She was advised to see me back in clinic  after these both images are complete.  She was given my phone number and radiology phone number to call and schedule. After our discussion, the patient articulated understanding of the plan and felt that all questions had been answered satisfactorily. The patient was pleased with the visit and very appreciative for the care rendered.    **Please excuse any errors in grammar or translation related to this dictation. Voice recognition software was utilized to prepare this document. **                  Susan Walton MD    Department of Orthopaedic Surgery  [facility]  [email or web address]

## 2023-12-07 ENCOUNTER — OFFICE VISIT (OUTPATIENT)
Dept: PAIN MEDICINE | Facility: CLINIC | Age: 73
End: 2023-12-07
Payer: MEDICARE

## 2023-12-07 VITALS
WEIGHT: 230 LBS | DIASTOLIC BLOOD PRESSURE: 80 MMHG | SYSTOLIC BLOOD PRESSURE: 174 MMHG | BODY MASS INDEX: 34.86 KG/M2 | RESPIRATION RATE: 18 BRPM | TEMPERATURE: 96.6 F | HEART RATE: 77 BPM | HEIGHT: 68 IN

## 2023-12-07 DIAGNOSIS — M54.12 CERVICAL NEURITIS: ICD-10-CM

## 2023-12-07 DIAGNOSIS — M48.02 CERVICAL STENOSIS OF SPINE: ICD-10-CM

## 2023-12-07 DIAGNOSIS — M16.9 OSTEOARTHROSIS, HIP: ICD-10-CM

## 2023-12-07 DIAGNOSIS — Z98.1 STATUS POST LUMBAR SPINAL FUSION: ICD-10-CM

## 2023-12-07 DIAGNOSIS — M54.10 CHRONIC RADICULAR PAIN OF LOWER EXTREMITY: Primary | ICD-10-CM

## 2023-12-07 DIAGNOSIS — G89.29 CHRONIC RADICULAR PAIN OF LOWER EXTREMITY: Primary | ICD-10-CM

## 2023-12-07 DIAGNOSIS — M47.22 OSTEOARTHRITIS OF SPINE WITH RADICULOPATHY, CERVICAL REGION: ICD-10-CM

## 2023-12-07 DIAGNOSIS — M96.1 POSTLAMINECTOMY SYNDROME, LUMBAR: ICD-10-CM

## 2023-12-07 PROCEDURE — 1159F MED LIST DOCD IN RCRD: CPT | Performed by: ANESTHESIOLOGY

## 2023-12-07 PROCEDURE — 99213 OFFICE O/P EST LOW 20 MIN: CPT | Performed by: ANESTHESIOLOGY

## 2023-12-07 PROCEDURE — 3077F SYST BP >= 140 MM HG: CPT | Performed by: ANESTHESIOLOGY

## 2023-12-07 PROCEDURE — 1036F TOBACCO NON-USER: CPT | Performed by: ANESTHESIOLOGY

## 2023-12-07 PROCEDURE — 1160F RVW MEDS BY RX/DR IN RCRD: CPT | Performed by: ANESTHESIOLOGY

## 2023-12-07 PROCEDURE — 1125F AMNT PAIN NOTED PAIN PRSNT: CPT | Performed by: ANESTHESIOLOGY

## 2023-12-07 PROCEDURE — 3079F DIAST BP 80-89 MM HG: CPT | Performed by: ANESTHESIOLOGY

## 2023-12-07 RX ORDER — TRAMADOL HYDROCHLORIDE 50 MG/1
50 TABLET ORAL EVERY 8 HOURS PRN
Qty: 90 TABLET | Refills: 1 | Status: SHIPPED | OUTPATIENT
Start: 2023-12-07 | End: 2024-02-07 | Stop reason: SDUPTHER

## 2023-12-07 ASSESSMENT — PAIN SCALES - GENERAL: PAINLEVEL: 7

## 2023-12-07 ASSESSMENT — ENCOUNTER SYMPTOMS
FREQUENCY: 1
NECK PAIN: 1
NUMBNESS: 1

## 2023-12-07 NOTE — PROGRESS NOTES
Pain #7/10 neck, back and right knee.   Pain comes and goes especially standing too long, doing too much.  Right hip injection 10-5-23 gave her 100% sustained pain relief. Taking 3 Tramadol per day and has 9 tablets left.   This helps reduce her pain by at least 50%.

## 2023-12-07 NOTE — PROGRESS NOTES
Subjective   Patient ID: Lizbet Beltrán is a 73 y.o. female complaining of severe cervical pain with radiculopathy.  She has had a anterior cervical fusion and still has some numbness and tingling in her hands.  Patient was seen by neurosurgeon who is recommending another cervical MRI as well as CT scan to ascertain whether or not it is coming from her neck or from carpal tunnel syndrome.  She also has developed neurogenic bladder since her low back surgery and is currently undergoing possibility of a bladder stimulator by Dr. Tate Huggins who gets.  She takes 3 tramadol tablets a day to moderate her pain.    Review of Systems   Genitourinary:  Positive for frequency.        Patient has a history of neurogenic bladder since her lumbar laminectomy   Musculoskeletal:  Positive for neck pain.   Neurological:  Positive for numbness.        Positive numbness tingling upper extremities secondary to cervical radiculopathy   All other systems reviewed and are negative.      Objective   Physical Exam  Gen. appearance:  Patient's alert and oriented ×3 ;cooperative mild to moderate distress  Heart: Regular rate and rhythm  Lungs: Clear to auscultation  Abdomen: Soft nontender  Neuro: Cranial nerves II -XII intact; DTR: +2 over 4 biceps triceps brachial radialis patellar and Achilles  Spinal exam: Positive paraspinal tenderness noted bilaterally L4 5 L5-S1 with flexion extension and rotation/no bony abnormalities  Musculoskeletal:  Upper and lower extremity strength was 4/5 bilaterally  :  deferred  Skin: Warm and dry      Assessment/Plan   Diagnoses and all orders for this visit:  Chronic radicular pain of lower extremity  Cervical neuritis  -     traMADol (Ultram) 50 mg tablet; Take 1 tablet (50 mg) by mouth every 8 hours if needed for severe pain (7 - 10).  -     Follow Up In Pain Medicine; Future  Postlaminectomy syndrome, lumbar  -     traMADol (Ultram) 50 mg tablet; Take 1 tablet (50 mg) by mouth every 8 hours if  needed for severe pain (7 - 10).  -     Follow Up In Pain Medicine; Future  Status post lumbar spinal fusion  Osteoarthritis of spine with radiculopathy, cervical region  Cervical stenosis of spine  Osteoarthrosis, hip  -     traMADol (Ultram) 50 mg tablet; Take 1 tablet (50 mg) by mouth every 8 hours if needed for severe pain (7 - 10).    Patient was e-prescribed tramadol 50 mg tablets #90 with 1 refill.  Follow-up visit has been scheduled in 2 months.  She was also encouraged to call if she had any further problems regarding her cervical radiculopathy which has been progressively worsening over the past month.

## 2023-12-13 ENCOUNTER — OFFICE VISIT (OUTPATIENT)
Dept: PULMONOLOGY | Facility: CLINIC | Age: 73
End: 2023-12-13
Payer: MEDICARE

## 2023-12-13 ENCOUNTER — HOSPITAL ENCOUNTER (OUTPATIENT)
Dept: RADIOLOGY | Facility: HOSPITAL | Age: 73
Discharge: HOME | End: 2023-12-13
Payer: MEDICARE

## 2023-12-13 VITALS
HEART RATE: 86 BPM | WEIGHT: 238 LBS | TEMPERATURE: 97.9 F | RESPIRATION RATE: 18 BRPM | OXYGEN SATURATION: 95 % | HEIGHT: 68 IN | BODY MASS INDEX: 36.07 KG/M2 | DIASTOLIC BLOOD PRESSURE: 69 MMHG | SYSTOLIC BLOOD PRESSURE: 162 MMHG

## 2023-12-13 DIAGNOSIS — J45.909 MILD ASTHMA, UNSPECIFIED WHETHER COMPLICATED, UNSPECIFIED WHETHER PERSISTENT (HHS-HCC): ICD-10-CM

## 2023-12-13 DIAGNOSIS — Z87.891 HISTORY OF NICOTINE DEPENDENCE: Primary | ICD-10-CM

## 2023-12-13 DIAGNOSIS — M96.0 PSEUDARTHROSIS FOLLOWING SPINAL FUSION: ICD-10-CM

## 2023-12-13 PROCEDURE — 1036F TOBACCO NON-USER: CPT | Performed by: STUDENT IN AN ORGANIZED HEALTH CARE EDUCATION/TRAINING PROGRAM

## 2023-12-13 PROCEDURE — 1160F RVW MEDS BY RX/DR IN RCRD: CPT | Performed by: STUDENT IN AN ORGANIZED HEALTH CARE EDUCATION/TRAINING PROGRAM

## 2023-12-13 PROCEDURE — 3077F SYST BP >= 140 MM HG: CPT | Performed by: STUDENT IN AN ORGANIZED HEALTH CARE EDUCATION/TRAINING PROGRAM

## 2023-12-13 PROCEDURE — 99213 OFFICE O/P EST LOW 20 MIN: CPT | Performed by: STUDENT IN AN ORGANIZED HEALTH CARE EDUCATION/TRAINING PROGRAM

## 2023-12-13 PROCEDURE — 72125 CT NECK SPINE W/O DYE: CPT

## 2023-12-13 PROCEDURE — 1159F MED LIST DOCD IN RCRD: CPT | Performed by: STUDENT IN AN ORGANIZED HEALTH CARE EDUCATION/TRAINING PROGRAM

## 2023-12-13 PROCEDURE — 72125 CT NECK SPINE W/O DYE: CPT | Performed by: STUDENT IN AN ORGANIZED HEALTH CARE EDUCATION/TRAINING PROGRAM

## 2023-12-13 PROCEDURE — 3078F DIAST BP <80 MM HG: CPT | Performed by: STUDENT IN AN ORGANIZED HEALTH CARE EDUCATION/TRAINING PROGRAM

## 2023-12-13 PROCEDURE — 1125F AMNT PAIN NOTED PAIN PRSNT: CPT | Performed by: STUDENT IN AN ORGANIZED HEALTH CARE EDUCATION/TRAINING PROGRAM

## 2023-12-13 RX ORDER — MIRABEGRON 50 MG/1
50 TABLET, EXTENDED RELEASE ORAL DAILY
COMMUNITY
End: 2024-03-12 | Stop reason: WASHOUT

## 2023-12-13 RX ORDER — BUDESONIDE 0.5 MG/2ML
INHALANT ORAL
Qty: 60 ML | Refills: 11 | Status: SHIPPED | OUTPATIENT
Start: 2023-12-13 | End: 2024-01-19 | Stop reason: SDUPTHER

## 2023-12-13 ASSESSMENT — PAIN SCALES - GENERAL: PAINLEVEL: 8

## 2023-12-13 ASSESSMENT — ENCOUNTER SYMPTOMS
CHILLS: 0
UNEXPECTED WEIGHT CHANGE: 0
ABDOMINAL PAIN: 0
ABDOMINAL DISTENTION: 0
PALPITATIONS: 0
FEVER: 0

## 2023-12-13 ASSESSMENT — ASTHMA QUESTIONNAIRES
QUESTION_3 LAST FOUR WEEKS HOW OFTEN DID YOUR ASTHMA SYMPTOMS (WHEEZING, COUGHING, SHORTNESS OF BREATH, CHEST TIGHTNESS OR PAIN) WAKE YOU UP AT NIGHT OR EARLIER THAN USUAL IN THE MORNING: NOT AT ALL
ACT_TOTALSCORE: 21
QUESTION_4 LAST FOUR WEEKS HOW OFTEN HAVE YOU USED YOUR RESCUE INHALER OR NEBULIZER MEDICATION (SUCH AS ALBUTEROL): 1 OR TWO TIMES PER DAY
QUESTION_5 LAST FOUR WEEKS HOW WOULD YOU RATE YOUR ASTHMA CONTROL: WELL CONTROLLED
QUESTION_1 LAST FOUR WEEKS HOW MUCH OF THE TIME DID YOUR ASTHMA KEEP YOU FROM GETTING AS MUCH DONE AT WORK, SCHOOL OR AT HOME: NONE OF THE TIME
QUESTION_2 LAST FOUR WEEKS HOW OFTEN HAVE YOU HAD SHORTNESS OF BREATH: NOT AT ALL

## 2023-12-13 NOTE — PROGRESS NOTES
Subjective   Patient ID: Lizbet Beltrán is a 73 y.o. female who presents for Asthma (Established).  HPI  12/13/2023    Accompanied by her    Has sinus infection a month ago --got antibiotics   No increase in respiratory symptoms since last time   Overall feels like she is doing well from a respiratory standpoint   MMRC: 1   ACT 21     Pulmonary medications: is on budesonide q12hrs       Last visit in May 2023   Previously seen by Dr. Chaudhry   on budesonide nebulizer q12hr for her asthma   symptoms are controlled   no hospitalization or exacerbations since her last pulm visit  does have somesinus rinses for her sinus problems  Does have GERD and started on ppi        PMH/PSH: cervical stenosis, neck fusion ( within a year) , hiatal hernia,barretts esophagus  FH: sister with breast cancer , sister with lung cancer   SH: quit 11 years ago, 1/2 ppd for 30 years . On disability from working in school cafeteria.      Pulm meds: budesonide nebulizer and prn albuterol, does take zyrtec   Review of Systems   Constitutional:  Negative for chills, fever and unexpected weight change.   Cardiovascular:  Negative for chest pain, palpitations and leg swelling.   Gastrointestinal:  Negative for abdominal distention and abdominal pain.   Skin:  Negative for rash.       Objective   Physical Exam  Vitals reviewed.   Constitutional:       General: She is awake.   Cardiovascular:      Rate and Rhythm: Normal rate and regular rhythm.   Pulmonary:      Effort: Pulmonary effort is normal.      Breath sounds: Normal breath sounds.   Neurological:      Mental Status: She is alert and oriented to person, place, and time.   Psychiatric:         Attention and Perception: Attention and perception normal.         Behavior: Behavior normal.         CT scan 5/2023  IMPRESSION:  1. Tiny 3 mm right apical nodule, likely benign. Continued screening  with low-dose noncontrast chest CT in 12 months (from current date)  is recommended.  2. Mild  upper lung predominant emphysema.  Assessment/Plan        72 year old female here to follow up of her asthma      Asthma--mild persistent   -continue budesonide nebulizer as prescribed--refilled today   -stay up to date on vaccinations   -control of gerd with ppi      lung ca screening  -pt qualifies for lung ca screening given smoking history--LDCT chest ordered--next due May 2024         rtc in 12 months or sooner if needed     Thu Gu MD 12/13/23 10:33 AM

## 2023-12-13 NOTE — PATIENT INSTRUCTIONS
Thank you for visiting the Pulmonary Clinic today.   Your breathing medications: budesonide nebulizer every 12 hours   Nebulizer equipment will be set up through DME   Return in 12 months   If you have questions or concerns, call [phone] (option 4)

## 2023-12-14 ENCOUNTER — APPOINTMENT (OUTPATIENT)
Dept: PAIN MEDICINE | Facility: CLINIC | Age: 73
End: 2023-12-14
Payer: MEDICARE

## 2023-12-22 ENCOUNTER — TELEPHONE (OUTPATIENT)
Dept: PRIMARY CARE | Facility: CLINIC | Age: 73
End: 2023-12-22
Payer: MEDICARE

## 2023-12-22 DIAGNOSIS — J01.01 ACUTE RECURRENT MAXILLARY SINUSITIS: Primary | ICD-10-CM

## 2023-12-22 RX ORDER — CEPHALEXIN 500 MG/1
500 CAPSULE ORAL 2 TIMES DAILY
Qty: 20 CAPSULE | Refills: 0 | Status: SHIPPED | OUTPATIENT
Start: 2023-12-22 | End: 2024-01-03 | Stop reason: ALTCHOICE

## 2023-12-24 ENCOUNTER — HOSPITAL ENCOUNTER (OUTPATIENT)
Dept: RADIOLOGY | Facility: HOSPITAL | Age: 73
Discharge: HOME | End: 2023-12-24
Payer: MEDICARE

## 2023-12-24 DIAGNOSIS — G95.9 CERVICAL MYELOPATHY (MULTI): ICD-10-CM

## 2023-12-24 PROCEDURE — 72141 MRI NECK SPINE W/O DYE: CPT

## 2023-12-24 PROCEDURE — 72141 MRI NECK SPINE W/O DYE: CPT | Performed by: STUDENT IN AN ORGANIZED HEALTH CARE EDUCATION/TRAINING PROGRAM

## 2024-01-03 ENCOUNTER — OFFICE VISIT (OUTPATIENT)
Dept: PRIMARY CARE | Facility: CLINIC | Age: 74
End: 2024-01-03
Payer: MEDICARE

## 2024-01-03 VITALS
SYSTOLIC BLOOD PRESSURE: 150 MMHG | HEIGHT: 68 IN | DIASTOLIC BLOOD PRESSURE: 74 MMHG | TEMPERATURE: 97.9 F | RESPIRATION RATE: 18 BRPM | BODY MASS INDEX: 36.19 KG/M2

## 2024-01-03 DIAGNOSIS — R09.89 LABILE HYPERTENSION: ICD-10-CM

## 2024-01-03 DIAGNOSIS — M54.10 CHRONIC RADICULAR PAIN OF LOWER EXTREMITY: ICD-10-CM

## 2024-01-03 DIAGNOSIS — E66.01 OBESITY, MORBID (MULTI): ICD-10-CM

## 2024-01-03 DIAGNOSIS — R53.81 MALAISE AND FATIGUE: ICD-10-CM

## 2024-01-03 DIAGNOSIS — Z96.652 HISTORY OF TOTAL LEFT KNEE REPLACEMENT: ICD-10-CM

## 2024-01-03 DIAGNOSIS — G89.29 CHRONIC BILATERAL LOW BACK PAIN WITHOUT SCIATICA: ICD-10-CM

## 2024-01-03 DIAGNOSIS — E78.2 MIXED HYPERLIPIDEMIA: ICD-10-CM

## 2024-01-03 DIAGNOSIS — Z00.00 ROUTINE GENERAL MEDICAL EXAMINATION AT HEALTH CARE FACILITY: Primary | ICD-10-CM

## 2024-01-03 DIAGNOSIS — M54.50 CHRONIC BILATERAL LOW BACK PAIN WITHOUT SCIATICA: ICD-10-CM

## 2024-01-03 DIAGNOSIS — G95.9 CERVICAL MYELOPATHY (MULTI): ICD-10-CM

## 2024-01-03 DIAGNOSIS — M12.9 ARTHROPATHY OF MULTIPLE SITES: ICD-10-CM

## 2024-01-03 DIAGNOSIS — N32.81 OVERACTIVE BLADDER: ICD-10-CM

## 2024-01-03 DIAGNOSIS — Z98.1 STATUS POST LUMBAR SPINAL FUSION: ICD-10-CM

## 2024-01-03 DIAGNOSIS — G62.9 PERIPHERAL POLYNEUROPATHY: ICD-10-CM

## 2024-01-03 DIAGNOSIS — M17.11 PRIMARY OSTEOARTHRITIS OF RIGHT KNEE: ICD-10-CM

## 2024-01-03 DIAGNOSIS — G89.29 CHRONIC RADICULAR PAIN OF LOWER EXTREMITY: ICD-10-CM

## 2024-01-03 DIAGNOSIS — R53.83 MALAISE AND FATIGUE: ICD-10-CM

## 2024-01-03 DIAGNOSIS — I10 PRIMARY HYPERTENSION: ICD-10-CM

## 2024-01-03 DIAGNOSIS — R07.89 ATYPICAL CHEST PAIN: ICD-10-CM

## 2024-01-03 LAB
ALBUMIN SERPL BCP-MCNC: 4.2 G/DL (ref 3.4–5)
ALP SERPL-CCNC: 80 U/L (ref 33–136)
ALT SERPL W P-5'-P-CCNC: 24 U/L (ref 7–45)
ANION GAP SERPL CALC-SCNC: 14 MMOL/L (ref 10–20)
AST SERPL W P-5'-P-CCNC: 21 U/L (ref 9–39)
BILIRUB SERPL-MCNC: 0.4 MG/DL (ref 0–1.2)
BUN SERPL-MCNC: 21 MG/DL (ref 6–23)
CALCIUM SERPL-MCNC: 9.3 MG/DL (ref 8.6–10.6)
CHLORIDE SERPL-SCNC: 103 MMOL/L (ref 98–107)
CHOLEST SERPL-MCNC: 181 MG/DL (ref 0–199)
CHOLESTEROL/HDL RATIO: 2.2
CO2 SERPL-SCNC: 30 MMOL/L (ref 21–32)
CREAT SERPL-MCNC: 0.69 MG/DL (ref 0.5–1.05)
ERYTHROCYTE [DISTWIDTH] IN BLOOD BY AUTOMATED COUNT: 13.7 % (ref 11.5–14.5)
GFR SERPL CREATININE-BSD FRML MDRD: >90 ML/MIN/1.73M*2
GLUCOSE SERPL-MCNC: 85 MG/DL (ref 74–99)
HCT VFR BLD AUTO: 36.5 % (ref 36–46)
HDLC SERPL-MCNC: 83.3 MG/DL
HGB BLD-MCNC: 11.3 G/DL (ref 12–16)
LDLC SERPL CALC-MCNC: 81 MG/DL
MCH RBC QN AUTO: 27.1 PG (ref 26–34)
MCHC RBC AUTO-ENTMCNC: 31 G/DL (ref 32–36)
MCV RBC AUTO: 88 FL (ref 80–100)
NON HDL CHOLESTEROL: 98 MG/DL (ref 0–149)
NRBC BLD-RTO: 0 /100 WBCS (ref 0–0)
PLATELET # BLD AUTO: 321 X10*3/UL (ref 150–450)
POTASSIUM SERPL-SCNC: 4.5 MMOL/L (ref 3.5–5.3)
PROT SERPL-MCNC: 7 G/DL (ref 6.4–8.2)
RBC # BLD AUTO: 4.17 X10*6/UL (ref 4–5.2)
SODIUM SERPL-SCNC: 142 MMOL/L (ref 136–145)
TRIGL SERPL-MCNC: 84 MG/DL (ref 0–149)
TSH SERPL-ACNC: 1.64 MIU/L (ref 0.44–3.98)
VLDL: 17 MG/DL (ref 0–40)
WBC # BLD AUTO: 8.4 X10*3/UL (ref 4.4–11.3)

## 2024-01-03 PROCEDURE — 1125F AMNT PAIN NOTED PAIN PRSNT: CPT | Performed by: FAMILY MEDICINE

## 2024-01-03 PROCEDURE — 80061 LIPID PANEL: CPT

## 2024-01-03 PROCEDURE — 85027 COMPLETE CBC AUTOMATED: CPT

## 2024-01-03 PROCEDURE — 1160F RVW MEDS BY RX/DR IN RCRD: CPT | Performed by: FAMILY MEDICINE

## 2024-01-03 PROCEDURE — 1159F MED LIST DOCD IN RCRD: CPT | Performed by: FAMILY MEDICINE

## 2024-01-03 PROCEDURE — 3078F DIAST BP <80 MM HG: CPT | Performed by: FAMILY MEDICINE

## 2024-01-03 PROCEDURE — G0439 PPPS, SUBSEQ VISIT: HCPCS | Performed by: FAMILY MEDICINE

## 2024-01-03 PROCEDURE — 1036F TOBACCO NON-USER: CPT | Performed by: FAMILY MEDICINE

## 2024-01-03 PROCEDURE — 84443 ASSAY THYROID STIM HORMONE: CPT

## 2024-01-03 PROCEDURE — 36415 COLL VENOUS BLD VENIPUNCTURE: CPT

## 2024-01-03 PROCEDURE — 1170F FXNL STATUS ASSESSED: CPT | Performed by: FAMILY MEDICINE

## 2024-01-03 PROCEDURE — 3077F SYST BP >= 140 MM HG: CPT | Performed by: FAMILY MEDICINE

## 2024-01-03 PROCEDURE — 99213 OFFICE O/P EST LOW 20 MIN: CPT | Performed by: FAMILY MEDICINE

## 2024-01-03 PROCEDURE — 80053 COMPREHEN METABOLIC PANEL: CPT

## 2024-01-03 RX ORDER — VALSARTAN 80 MG/1
80 TABLET ORAL DAILY
Qty: 30 TABLET | Refills: 11 | Status: SHIPPED | OUTPATIENT
Start: 2024-01-03 | End: 2024-03-07

## 2024-01-03 ASSESSMENT — ACTIVITIES OF DAILY LIVING (ADL)
BATHING: INDEPENDENT
MANAGING_FINANCES: INDEPENDENT
TAKING_MEDICATION: INDEPENDENT
DRESSING: INDEPENDENT
DOING_HOUSEWORK: NEEDS ASSISTANCE
GROCERY_SHOPPING: NEEDS ASSISTANCE

## 2024-01-03 ASSESSMENT — PATIENT HEALTH QUESTIONNAIRE - PHQ9
2. FEELING DOWN, DEPRESSED OR HOPELESS: NOT AT ALL
SUM OF ALL RESPONSES TO PHQ9 QUESTIONS 1 AND 2: 0
1. LITTLE INTEREST OR PLEASURE IN DOING THINGS: NOT AT ALL

## 2024-01-03 ASSESSMENT — ENCOUNTER SYMPTOMS
LOSS OF SENSATION IN FEET: 0
DEPRESSION: 0
OCCASIONAL FEELINGS OF UNSTEADINESS: 0

## 2024-01-03 NOTE — PROGRESS NOTES
"Subjective   Reason for Visit: Lizbet Beltrán is an 73 y.o. female here for a Medicare Wellness visit.     Past Medical, Surgical, and Family History reviewed and updated in chart.    Reviewed all medications by prescribing practitioner or clinical pharmacist (such as prescriptions, OTCs, herbal therapies and supplements) and documented in the medical record.    HPI  : Patient is a 73-year-old female who has multiple medical problems and is in today for her Medicare wellness exam.  She did answer the questions as presented by the medical assistant.  She does have a living will and medical power of .  She has had several recent surgeries including revision of her left total knee replacement, cervical spine laminectomy and fusion, she also had a lumbar laminectomy and fusion.  She also is seeing urology due to overactive bladder problems and is trying a bladder neurostimulator device to see if it helps.  She will be wearing this for 2 weeks and if it does help she will have a permanent placement of the [employer] bladder stimulator.  She also will have her blood work done today and she will be notified of the results.  Her blood pressure has been fluctuating lately and she will need to be started on some blood pressure medication.    Patient Care Team:  Vito Ordonez DO as PCP - General  Maurilio Be MD as PCP - [employer] PCP     Review of Systems   :  10 systems were reviewed and the information is included in the HPI and no additional review of systems is indicated.    Objective   Vitals:  Temp 36.6 °C (97.9 °F)   Resp 18   Ht 1.727 m (5' 8\")   BMI 36.19 kg/m²       Physical Exam  Vitals and nursing note reviewed.   Constitutional:       Appearance: Normal appearance. She is obese.      Comments: Patient is alert and oriented x3.  Patient is trying to watch her diet and lose some weight.   HENT:      Head: Normocephalic.      Right Ear: Tympanic membrane and external ear normal. "      Left Ear: Tympanic membrane and external ear normal.      Ears:      Comments: Patient states she gets some clicking in her ears and she had recent sinus infection which was treated.  Both TMs are clear and the canals are patent.     Nose: Congestion present.      Comments: Mild sinus congestion but she did have a recent sinus infection and did complete her Keflex.  No signs of recurrent infection.     Mouth/Throat:      Mouth: Mucous membranes are moist.      Pharynx: Oropharynx is clear.      Comments: Mouth is moist, tongue is midline.  No posterior pharyngeal erythema.  Eyes:      Extraocular Movements: Extraocular movements intact.      Conjunctiva/sclera: Conjunctivae normal.      Pupils: Pupils are equal, round, and reactive to light.      Comments: No visual disturbance, does have her eyes examined once a year.   Neck:      Comments: Patient has restriction of motion cervical spine due to the recent cervical fusion.  She also has some paresthesias in her shoulders and both arms and fingers.  She had a recent repeat MRI scan and has an appointment with neurosurgery next week.  No carotid bruits, no thyromegaly, no cervical adenopathy.    Cardiovascular:      Rate and Rhythm: Normal rate and regular rhythm.      Pulses: Normal pulses.      Heart sounds: Normal heart sounds.      Comments: Patient denies chest pain and no palpitations.  Heart rhythm is stable S1 and S2 are noted, no ectopics.  Pulmonary:      Effort: Pulmonary effort is normal.      Breath sounds: Normal breath sounds.      Comments: Patient has history of asthmatic bronchitis and is using a home nebulizer machine.  Currently her lungs are clear to auscultation.  Abdominal:      General: Bowel sounds are normal.      Palpations: Abdomen is soft.      Comments: Abdomen is soft and obese and difficult to evaluate due to obesity.  No flank tenderness.  No suprapubic pain.  Positive bowel sounds x4.     Genitourinary:     Comments: Overactive  bladder problems and is seeing urology and had recent placement of a temporary bladder nerve stimulator and she goes back and another week for recheck.  Musculoskeletal:         General: Tenderness present.      Cervical back: Tenderness present.      Right lower leg: Edema present.      Left lower leg: Edema present.      Comments: Patient had a recent revision of her left total knee replacement and is doing well.  She has an appointment coming up with her orthopedic surgeon next months.  She may need a right total knee replacement.  She also is seeing neurosurgery regarding her post cervical fusion and also her post lumbar fusion.  She recently had MRIs of both her cervical and lumbar spines and has an upcoming appointment with neurosurgery next week.  She currently is ambulating with a wheeled walker.   Skin:     General: Skin is warm and dry.      Comments: There is no bruising, no erythema, no skin lesions noted, no rashes.   Neurological:      Mental Status: She is alert and oriented to person, place, and time. Mental status is at baseline.      Sensory: Sensory deficit present.      Motor: Weakness present.      Coordination: Coordination abnormal.      Comments: Patient had revision of the left total knee replacement about a year and a half ago and is stable.  She is following with orthopedics in a few weeks.  Her right knee is severely arthritic and that may need gel shots.  Patient also had cervical and lumbar laminectomy with fusions.  She is following with neurosurgery and just had recent repeat MRI scans and also her cervical spine and lumbar spine.  He has an appointment next week with neurosurgery for further review of the MRI scans.  She is still ambulating with a walker and her gait is unsteady.   Psychiatric:         Behavior: Behavior normal.         Thought Content: Thought content normal.         Judgment: Judgment normal.      Comments: Patient has normal mood and affect does have anxiety about  her health problems and everything that she has recently gone through surgically.  Thought content and judgment are stable.  No signs of vascular dementia.  Activity is limited since she must use a wheeled walker and her gait is unsteady.     PLAN : Patient is a 73-year-old female who is in for her Medicare wellness exam today.  She did have her blood work drawn and will be notified of the results in 4 days.  Further recommendations will be made after review of her blood results.  She also completed the Medicare questionnaire form as presented by the medical assistant.  She does have a living will and medical power of .  Patient also is wearing a neurostimulator for her bladder problems and she is following with urology.  She will see neurosurgery next week for review of her recent MRI scans and patient will follow-up in this office in 4 to 6 months.  She otherwise is stable.  Further recommendations will be made after review of her blood results.    Assessment/Plan   Problem List Items Addressed This Visit       Atypical chest pain    Relevant Orders    CBC    Comprehensive Metabolic Panel    Lipid Panel    Thyroid Stimulating Hormone    Hyperlipidemia    Relevant Orders    CBC    Comprehensive Metabolic Panel    Lipid Panel    Thyroid Stimulating Hormone    Hypertension    Relevant Orders    CBC    Comprehensive Metabolic Panel    Lipid Panel    Thyroid Stimulating Hormone     Other Visit Diagnoses       Routine general medical examination at health care facility    -  Primary    Malaise and fatigue        Relevant Orders    CBC    Comprehensive Metabolic Panel    Lipid Panel    Thyroid Stimulating Hormone

## 2024-01-08 ENCOUNTER — OFFICE VISIT (OUTPATIENT)
Dept: ORTHOPEDIC SURGERY | Facility: CLINIC | Age: 74
End: 2024-01-08
Payer: MEDICARE

## 2024-01-08 VITALS — WEIGHT: 238 LBS | BODY MASS INDEX: 36.07 KG/M2 | HEIGHT: 68 IN

## 2024-01-08 DIAGNOSIS — G95.9 CERVICAL MYELOPATHY (MULTI): Primary | ICD-10-CM

## 2024-01-08 PROCEDURE — 99214 OFFICE O/P EST MOD 30 MIN: CPT | Performed by: ORTHOPAEDIC SURGERY

## 2024-01-08 PROCEDURE — 1125F AMNT PAIN NOTED PAIN PRSNT: CPT | Performed by: ORTHOPAEDIC SURGERY

## 2024-01-08 PROCEDURE — 1036F TOBACCO NON-USER: CPT | Performed by: ORTHOPAEDIC SURGERY

## 2024-01-08 PROCEDURE — 1160F RVW MEDS BY RX/DR IN RCRD: CPT | Performed by: ORTHOPAEDIC SURGERY

## 2024-01-08 PROCEDURE — 1159F MED LIST DOCD IN RCRD: CPT | Performed by: ORTHOPAEDIC SURGERY

## 2024-01-08 ASSESSMENT — PAIN SCALES - GENERAL: PAINLEVEL_OUTOF10: 7

## 2024-01-08 ASSESSMENT — PAIN DESCRIPTION - DESCRIPTORS: DESCRIPTORS: ACHING

## 2024-01-08 ASSESSMENT — PAIN - FUNCTIONAL ASSESSMENT: PAIN_FUNCTIONAL_ASSESSMENT: 0-10

## 2024-01-08 NOTE — PROGRESS NOTES
S: Lizbet Beltrán is a 73 y.o. year old female patient who is here to follow up on CT and MRI cervical spine.  Patient was last seen in my clinic on December 4, 2023.  She has a history of C3-C4 C4-C5 C5-C6 anterior cervical discectomy and fusion with Dr. Burris on December 2, 2022 for cervical stenosis.  She was doing fine up until around October or November 2023 she feels like her myelopathic symptoms are returning.  She noticed more clumsiness with the use of her hands where she is dropping things again more frequently.  She also noticed difficulty with gait and balance where she catches herself falling.  Otherwise no radicular pain.  She has some neck pain.  No bowel or bladder incontinence or saddle anesthesia.  She has been walking with a wheeled walker.  At the last visit she had x-rays which showed some lucency around the screw and I was concerned for possible pseudoarthrosis I ordered a CT scan as well as a cervical MRI which she is here to follow-up on.      O:     General: Patient appears well-nourished and well-developed in no acute distress, Alert and Oriented x3, obese female  Psych: Pleasant mood and affect  HEENT: Extraocular muscles intact, pupils equal and round. Sclerae anicteric   Cardio: extremities warm and well perfused  Resp: unlabored symmetric breathing  Skin: Well-healed anterior cervical incision  Musculoskeletal/Neuro Exam: Wide-based gait with a wheeled walker. . No tenderness to palpation along the cervical midline and paraspinal regions. Negative Lhermitte sign. Negative Spurling bilaterally.         Upper extremity: Mild atrophy of bilateral thenar muscles in bilateral hands.  Positive Tinel and Durkan over the carpal tunnel in the left hand     Motor: Right upper extremity was 5 out of 5 strength with shoulder abduction, elbow flexion and extension, wrist flexion and extension and  against resistance  Left upper extremity with 5 out of 5 strength with shoulder abduction, elbow  flexion and extension, wrist flexion and extension and  against resistance     Sensation to light touch intact along C5-T1 distribution bilaterally     Reflex: 2+ brachioradialis and biceps bilaterally     Upper Motor Signs: Positive Lane sign on the left     Lower extremity     Motor: Right leg with 5 out of 5 motor strength with hip flexion, knee extension, ankle dorsiflexion plantarflexion EHL against resistance  Left leg with 5 out of 5 motor strength with hip flexion, knee extension, ankle dorsiflexion plantarflexion EHL against resistance     Sensation to light touch intact along L2-S1 distribution bilaterally          Imaging:  I reviewed x-rays of the cervical spine obtained on December 4, 2023.  AP lateral flexion-extension view.  She has anterior hardware with stand-alone cages between C3-C4, C4-C5, and C5-C6.  Hardware appears intact no evidence of broken hardware but there appears to be some possibly lucency between the screws in C4 and C5.     I reviewed the CT scan of the cervical spine from December 13, 2023 which shows lucency around the hardware anterior cervical spine between C3-C6.  No acute fractures.  Multilevel spondylotic changes.    I also reviewed the MRI of the cervical spine from December 24, 2023 which shows postoperative changes between C3-C6 with some improvement in patency of the spinal canal however degenerative changes with posterior disc osteophyte complex are still present causing moderate canal stenosis at C5-C6 , C3-C4 and C4-C5.      A/P: Lizbet Beltrán is a 73 y.o. year old female patient with history of cervical stenosis and myelopathy status post C3-C4, C4-C5 and C5-C6 anterior cervical discectomy and fusion with Dr. Burris December 2, 2022.  She has developed some pseudoarthrosis with some loosening of the hardware.  There is still some residual narrowing of the spinal canal which can explain her myelopathic symptoms.  I review all the images with her in clinic  today along with her .  I discussed the option of surgery for posterior cervical fusion with C2-T1 to fix the pseudoarthrosis from her anterior fusion.  Given the continued narrowing we will recommend laminectomy from C3-C6 to decompress the spinal cord.  I discussed with her the natural history of cervical myelopathy that if there continue to be compression around the spinal cord her symptoms can progress.    I discussed the risks of surgery which includes but not limited to infection at the surgical site or wound healing requiring additional surgery to clean the infection and long term IV antibiotics. There is risk of blood loss requiring blood transfusion. Risk of dural tear requiring repair and possible continue cerebral spinal fluid leakage and positional headaches. Risk of Nerve root injury resulting in temporary or permeant weakness, numbness, or radicular pain such as a nerve root palsy. Risk of epidural hematoma and spinal cord compression resulting in weakness in extremity and bowel and bladder disturbances requiring additional surgery to clear out hematoma. Risk of nonunion or hardware failure requiring additional surgery to correct this. Risk of adjacent level disease in the future requiring additional surgery in the future to address this. Risk of needing to remain intubated after surgery for facial swelling. Risk of dysphagia or difficulty swallowing requiring feeding tube breifly. Risk of blindness if need to be in a prone position for surgery. Other complications include skin breakdown or skin blistering from being prone for long hours, developing MI, stroke, DVT, PE during or after surgery. Risk of death. Risk of complications from anesthesia requiring prolong intubation. Risk of javi coronavirus and the complications associated with that while in the hospital.     After our discussion the patient would like to think about this and discuss it further with her family.  She can schedule  an appointment with me if he has additional questions regarding surgery. After our discussion, the patient articulated understanding of the plan and felt that all questions had been answered satisfactorily. The patient was pleased with the visit and very appreciative for the care rendered.    **Please excuse any errors in grammar or translation related to this dictation. Voice recognition software was utilized to prepare this document. **                    Susan Walton MD    Department of Orthopaedic Surgery  [facility]  [email or web address]

## 2024-01-19 DIAGNOSIS — J45.909 MILD ASTHMA, UNSPECIFIED WHETHER COMPLICATED, UNSPECIFIED WHETHER PERSISTENT (HHS-HCC): ICD-10-CM

## 2024-01-22 RX ORDER — BUDESONIDE 0.5 MG/2ML
INHALANT ORAL
Qty: 120 ML | Refills: 11 | Status: SHIPPED | OUTPATIENT
Start: 2024-01-22

## 2024-02-07 ENCOUNTER — OFFICE VISIT (OUTPATIENT)
Dept: PAIN MEDICINE | Facility: CLINIC | Age: 74
End: 2024-02-07
Payer: MEDICARE

## 2024-02-07 VITALS
SYSTOLIC BLOOD PRESSURE: 156 MMHG | HEART RATE: 75 BPM | TEMPERATURE: 96.8 F | DIASTOLIC BLOOD PRESSURE: 74 MMHG | HEIGHT: 68 IN | WEIGHT: 238 LBS | BODY MASS INDEX: 36.07 KG/M2 | RESPIRATION RATE: 18 BRPM

## 2024-02-07 DIAGNOSIS — M54.16 LUMBAR RADICULOPATHY: Primary | ICD-10-CM

## 2024-02-07 DIAGNOSIS — M96.1 POSTLAMINECTOMY SYNDROME, LUMBAR: ICD-10-CM

## 2024-02-07 DIAGNOSIS — M54.12 CERVICAL NEURITIS: ICD-10-CM

## 2024-02-07 DIAGNOSIS — M16.9 OSTEOARTHROSIS, HIP: ICD-10-CM

## 2024-02-07 PROCEDURE — 99213 OFFICE O/P EST LOW 20 MIN: CPT | Performed by: ANESTHESIOLOGY

## 2024-02-07 PROCEDURE — 1036F TOBACCO NON-USER: CPT | Performed by: ANESTHESIOLOGY

## 2024-02-07 PROCEDURE — 1160F RVW MEDS BY RX/DR IN RCRD: CPT | Performed by: ANESTHESIOLOGY

## 2024-02-07 PROCEDURE — 1125F AMNT PAIN NOTED PAIN PRSNT: CPT | Performed by: ANESTHESIOLOGY

## 2024-02-07 PROCEDURE — 1159F MED LIST DOCD IN RCRD: CPT | Performed by: ANESTHESIOLOGY

## 2024-02-07 PROCEDURE — 3077F SYST BP >= 140 MM HG: CPT | Performed by: ANESTHESIOLOGY

## 2024-02-07 PROCEDURE — 3078F DIAST BP <80 MM HG: CPT | Performed by: ANESTHESIOLOGY

## 2024-02-07 RX ORDER — TRAMADOL HYDROCHLORIDE 50 MG/1
50 TABLET ORAL EVERY 8 HOURS PRN
Qty: 90 TABLET | Refills: 1 | Status: SHIPPED | OUTPATIENT
Start: 2024-02-07 | End: 2024-03-28 | Stop reason: SDUPTHER

## 2024-02-07 ASSESSMENT — ENCOUNTER SYMPTOMS
OCCASIONAL FEELINGS OF UNSTEADINESS: 1
DEPRESSION: 0
LOSS OF SENSATION IN FEET: 1

## 2024-02-07 ASSESSMENT — COLUMBIA-SUICIDE SEVERITY RATING SCALE - C-SSRS
2. HAVE YOU ACTUALLY HAD ANY THOUGHTS OF KILLING YOURSELF?: NO
1. IN THE PAST MONTH, HAVE YOU WISHED YOU WERE DEAD OR WISHED YOU COULD GO TO SLEEP AND NOT WAKE UP?: NO
6. HAVE YOU EVER DONE ANYTHING, STARTED TO DO ANYTHING, OR PREPARED TO DO ANYTHING TO END YOUR LIFE?: NO

## 2024-02-07 ASSESSMENT — PAIN SCALES - GENERAL: PAINLEVEL: 8

## 2024-02-07 NOTE — PROGRESS NOTES
Pain #8/10 to her left hip down through the left thigh to the knee.  This pain started in the last week without any specific event.  Saw Dr Hernandez this past Monday and he ordered her MRI, CT scan which have yet to be done.  Taking 3 Tramadol per day and has 10 tablets left.  This reduces her pain by 50-60%.  Pain is worse in the morning and at night.

## 2024-02-07 NOTE — PROGRESS NOTES
Subjective   Patient ID: Lizbet Beltrán is a 73 y.o. female here today for follow-up visit and med management.  She has had previously 2 lumbar laminectomies and fusions as well as a cervical laminectomy x 1.  She is now complaining of severe low back pain which radiates down her left lateral thigh to her ankle.  New MRI was ordered by her neurosurgeon which has not been done at this point in time.  She has responded well to epidural steroid injections will be scheduled for a caudal epidural block in 1 week on February 15, 2024    HPI  Chronic low back pain secondary to lumbar laminectomies and fusion.  New onset of lumbosacral radiculopathy.  Patient has a longstanding history of medication management for pain control  Review of Systems  Unremarkable except for chief complaint  Objective   Physical Exam  Gen. appearance:  Patient's alert and oriented ×3 ;cooperative mild to moderate distress  Heart: Regular rate and rhythm  Lungs: Clear to auscultation  Abdomen: Soft nontender  Neuro: Cranial nerves II -XII intact; DTR: +2 over 4 biceps triceps brachial radialis patellar and Achilles  Spinal exam: Positive paraspinal tenderness noted bilaterally L4 5 L5-S1 with flexion extension and rotation/no bony abnormalities  Musculoskeletal:  Upper and lower extremity strength was 4/5 bilaterally  :  deferred  Skin: Warm and dry      Assessment/Plan   Diagnoses and all orders for this visit:  Lumbar radiculopathy  -     traMADol (Ultram) 50 mg tablet; Take 1 tablet (50 mg) by mouth every 8 hours if needed for severe pain (7 - 10).  -     Epidural Steroid Injection; Future  -     FL pain management TC; Future  Cervical neuritis  -     traMADol (Ultram) 50 mg tablet; Take 1 tablet (50 mg) by mouth every 8 hours if needed for severe pain (7 - 10).  Postlaminectomy syndrome, lumbar  -     traMADol (Ultram) 50 mg tablet; Take 1 tablet (50 mg) by mouth every 8 hours if needed for severe pain (7 - 10).  -     Epidural Steroid  Injection; Future  -     FL pain management TC; Future  Osteoarthrosis, hip  -     traMADol (Ultram) 50 mg tablet; Take 1 tablet (50 mg) by mouth every 8 hours if needed for severe pain (7 - 10).  Risk and benefits of epidural steroid injection was discussed with her on the schedule on February 15, 2024 under local anesthesia.  She was told to take the tramadol as written which is 1 p.o. every 8 hours as needed for pain.  Patient was also told to be n.p.o. for 6 hours prior to scheduled procedure next week.

## 2024-02-15 ENCOUNTER — HOSPITAL ENCOUNTER (OUTPATIENT)
Dept: RADIOLOGY | Facility: CLINIC | Age: 74
Discharge: HOME | End: 2024-02-15
Payer: MEDICARE

## 2024-02-15 ENCOUNTER — HOSPITAL ENCOUNTER (OUTPATIENT)
Dept: PAIN MEDICINE | Facility: CLINIC | Age: 74
Discharge: HOME | End: 2024-02-15
Payer: MEDICARE

## 2024-02-15 ENCOUNTER — APPOINTMENT (OUTPATIENT)
Dept: RADIOLOGY | Facility: CLINIC | Age: 74
End: 2024-02-15
Payer: MEDICARE

## 2024-02-15 ENCOUNTER — APPOINTMENT (OUTPATIENT)
Dept: PAIN MEDICINE | Facility: CLINIC | Age: 74
End: 2024-02-15
Payer: MEDICARE

## 2024-02-15 VITALS
RESPIRATION RATE: 18 BRPM | TEMPERATURE: 96.6 F | SYSTOLIC BLOOD PRESSURE: 167 MMHG | DIASTOLIC BLOOD PRESSURE: 69 MMHG | HEART RATE: 74 BPM | OXYGEN SATURATION: 95 %

## 2024-02-15 DIAGNOSIS — M96.1 POSTLAMINECTOMY SYNDROME, LUMBAR: ICD-10-CM

## 2024-02-15 DIAGNOSIS — M54.16 LUMBAR RADICULOPATHY: ICD-10-CM

## 2024-02-15 DIAGNOSIS — M47.22 OSTEOARTHRITIS OF SPINE WITH RADICULOPATHY, CERVICAL REGION: ICD-10-CM

## 2024-02-15 PROCEDURE — 2500000004 HC RX 250 GENERAL PHARMACY W/ HCPCS (ALT 636 FOR OP/ED)

## 2024-02-15 PROCEDURE — 62323 NJX INTERLAMINAR LMBR/SAC: CPT | Performed by: ANESTHESIOLOGY

## 2024-02-15 PROCEDURE — 2500000001 HC RX 250 WO HCPCS SELF ADMINISTERED DRUGS (ALT 637 FOR MEDICARE OP)

## 2024-02-15 PROCEDURE — 77003 FLUOROGUIDE FOR SPINE INJECT: CPT

## 2024-02-15 PROCEDURE — 2500000005 HC RX 250 GENERAL PHARMACY W/O HCPCS

## 2024-02-15 RX ORDER — CEPHALEXIN 500 MG/1
CAPSULE ORAL
Status: COMPLETED
Start: 2024-02-15 | End: 2024-02-15

## 2024-02-15 RX ORDER — METHYLPREDNISOLONE ACETATE 80 MG/ML
INJECTION, SUSPENSION INTRA-ARTICULAR; INTRALESIONAL; INTRAMUSCULAR; SOFT TISSUE
Status: COMPLETED
Start: 2024-02-15 | End: 2024-02-15

## 2024-02-15 RX ORDER — LIDOCAINE HYDROCHLORIDE 5 MG/ML
INJECTION, SOLUTION INFILTRATION; INTRAVENOUS
Status: COMPLETED
Start: 2024-02-15 | End: 2024-02-15

## 2024-02-15 RX ORDER — CEPHALEXIN 500 MG/1
500 CAPSULE ORAL ONCE
Status: DISCONTINUED | OUTPATIENT
Start: 2024-02-15 | End: 2024-02-16 | Stop reason: HOSPADM

## 2024-02-15 RX ADMIN — LIDOCAINE HYDROCHLORIDE 250 MG: 5 INJECTION, SOLUTION INFILTRATION at 09:30

## 2024-02-15 RX ADMIN — CEPHALEXIN 500 MG: 500 CAPSULE ORAL at 09:19

## 2024-02-15 RX ADMIN — METHYLPREDNISOLONE ACETATE 80 MG: 80 INJECTION, SUSPENSION INTRA-ARTICULAR; INTRALESIONAL; INTRAMUSCULAR; SOFT TISSUE at 09:29

## 2024-02-15 ASSESSMENT — PAIN SCALES - GENERAL
PAINLEVEL_OUTOF10: 8
PAINLEVEL_OUTOF10: 3

## 2024-02-15 ASSESSMENT — PAIN - FUNCTIONAL ASSESSMENT
PAIN_FUNCTIONAL_ASSESSMENT: 0-10
PAIN_FUNCTIONAL_ASSESSMENT: 0-10

## 2024-02-15 ASSESSMENT — ENCOUNTER SYMPTOMS
LOSS OF SENSATION IN FEET: 0
DEPRESSION: 0
OCCASIONAL FEELINGS OF UNSTEADINESS: 1

## 2024-02-15 ASSESSMENT — PATIENT HEALTH QUESTIONNAIRE - PHQ9
SUM OF ALL RESPONSES TO PHQ9 QUESTIONS 1 AND 2: 0
2. FEELING DOWN, DEPRESSED OR HOPELESS: NOT AT ALL
1. LITTLE INTEREST OR PLEASURE IN DOING THINGS: NOT AT ALL

## 2024-02-15 ASSESSMENT — COLUMBIA-SUICIDE SEVERITY RATING SCALE - C-SSRS
1. IN THE PAST MONTH, HAVE YOU WISHED YOU WERE DEAD OR WISHED YOU COULD GO TO SLEEP AND NOT WAKE UP?: NO
6. HAVE YOU EVER DONE ANYTHING, STARTED TO DO ANYTHING, OR PREPARED TO DO ANYTHING TO END YOUR LIFE?: NO
2. HAVE YOU ACTUALLY HAD ANY THOUGHTS OF KILLING YOURSELF?: NO

## 2024-02-15 NOTE — OP NOTE
Date: 2/15/2024  OR Location: [facility] NON-OR PROCEDURES    Name: Lizbet Beltrán, : 1950, Age: 73 y.o., MRN: [id number], Sex: female    Diagnosis   1. Postlaminectomy syndrome, lumbar  Epidural Steroid Injection    Epidural Steroid Injection    Epidural Steroid Injection    FL pain management TC      2. Lumbar radiculopathy  Epidural Steroid Injection    Epidural Steroid Injection    Epidural Steroid Injection    FL pain management TC      3. Osteoarthritis of spine with radiculopathy, cervical region  cephalexin (Keflex) capsule 500 mg        Clinical Note: The patient is a 73 y.o. year-old female who is here today to undergo caudal epidural block number 1  under local anesthesia.  The patient and has a signed consent on file for today´s ERI.   Preop/postop diagnosis:   1. Postlaminectomy syndrome, lumbar  Epidural Steroid Injection    Epidural Steroid Injection    Epidural Steroid Injection    FL pain management TC      2. Lumbar radiculopathy  Epidural Steroid Injection    Epidural Steroid Injection    Epidural Steroid Injection    FL pain management TC      3. Osteoarthritis of spine with radiculopathy, cervical region  cephalexin (Keflex) capsule 500 mg         Anesthesia: local  Procedure: Caudal epidural steroid injection  Procedures    The patient was placed in a prone position on the OR table, sterile prep and drape of lower lumbar spine was done with Betadine and ChloraPrep X 3.  A sterile 22-gauge 1-1/2 inch sterile spinal needle was inserted into the caudal canal. Isovue 2ml outlined the epidural space, as seen on a cross table lateral view with fluoroscopy which was followed by 80mg of Depo Medrol and 4ml of  0.5% Xylocaine plain.  The needle was then removed and a sterile bandage was applied over the puncture site with Neosporin ointment.  The patient was able to tolerate the procedure well and was taken to the recovery room in stable, satisfactory condition with no neurological deficit. Patient  is scheduled for a follow up visit in 1 month.

## 2024-03-06 DIAGNOSIS — M48.062 LUMBAR STENOSIS WITH NEUROGENIC CLAUDICATION: Primary | ICD-10-CM

## 2024-03-07 DIAGNOSIS — R09.89 LABILE HYPERTENSION: ICD-10-CM

## 2024-03-07 RX ORDER — VALSARTAN 80 MG/1
80 TABLET ORAL DAILY
Qty: 90 TABLET | Refills: 3 | Status: SHIPPED | OUTPATIENT
Start: 2024-03-07 | End: 2025-03-07

## 2024-03-08 ENCOUNTER — APPOINTMENT (OUTPATIENT)
Dept: NEUROSURGERY | Facility: CLINIC | Age: 74
End: 2024-03-08
Payer: MEDICARE

## 2024-03-11 ENCOUNTER — HOSPITAL ENCOUNTER (OUTPATIENT)
Dept: RADIOLOGY | Facility: HOSPITAL | Age: 74
Discharge: HOME | End: 2024-03-11
Payer: MEDICARE

## 2024-03-11 DIAGNOSIS — M96.1 POSTLAMINECTOMY SYNDROME, NOT ELSEWHERE CLASSIFIED: ICD-10-CM

## 2024-03-11 PROCEDURE — 2550000001 HC RX 255 CONTRASTS

## 2024-03-11 PROCEDURE — 72131 CT LUMBAR SPINE W/O DYE: CPT | Performed by: RADIOLOGY

## 2024-03-11 PROCEDURE — 72131 CT LUMBAR SPINE W/O DYE: CPT

## 2024-03-11 PROCEDURE — A9575 INJ GADOTERATE MEGLUMI 0.1ML: HCPCS

## 2024-03-11 PROCEDURE — 72158 MRI LUMBAR SPINE W/O & W/DYE: CPT

## 2024-03-11 RX ORDER — GADOTERATE MEGLUMINE 376.9 MG/ML
20 INJECTION INTRAVENOUS
Status: COMPLETED | OUTPATIENT
Start: 2024-03-11 | End: 2024-03-11

## 2024-03-11 RX ADMIN — GADOTERATE MEGLUMINE 20 ML: 376.9 INJECTION INTRAVENOUS at 10:55

## 2024-03-12 ENCOUNTER — OFFICE VISIT (OUTPATIENT)
Dept: NEUROSURGERY | Facility: CLINIC | Age: 74
End: 2024-03-12
Payer: MEDICARE

## 2024-03-12 VITALS
HEIGHT: 68 IN | RESPIRATION RATE: 20 BRPM | SYSTOLIC BLOOD PRESSURE: 136 MMHG | TEMPERATURE: 97.5 F | HEART RATE: 77 BPM | DIASTOLIC BLOOD PRESSURE: 72 MMHG | WEIGHT: 239 LBS | BODY MASS INDEX: 36.22 KG/M2

## 2024-03-12 DIAGNOSIS — M47.12 CERVICAL SPONDYLOSIS WITH MYELOPATHY: Primary | ICD-10-CM

## 2024-03-12 DIAGNOSIS — S32.009K PSEUDOARTHROSIS OF LUMBAR SPINE: ICD-10-CM

## 2024-03-12 DIAGNOSIS — M96.1 FAILED BACK SURGICAL SYNDROME: ICD-10-CM

## 2024-03-12 DIAGNOSIS — M81.0 AGE RELATED OSTEOPOROSIS, UNSPECIFIED PATHOLOGICAL FRACTURE PRESENCE: ICD-10-CM

## 2024-03-12 DIAGNOSIS — S12.9XXA: ICD-10-CM

## 2024-03-12 PROCEDURE — 1036F TOBACCO NON-USER: CPT | Performed by: NEUROLOGICAL SURGERY

## 2024-03-12 PROCEDURE — 3075F SYST BP GE 130 - 139MM HG: CPT | Performed by: NEUROLOGICAL SURGERY

## 2024-03-12 PROCEDURE — 99205 OFFICE O/P NEW HI 60 MIN: CPT | Performed by: NEUROLOGICAL SURGERY

## 2024-03-12 PROCEDURE — 1160F RVW MEDS BY RX/DR IN RCRD: CPT | Performed by: NEUROLOGICAL SURGERY

## 2024-03-12 PROCEDURE — 1159F MED LIST DOCD IN RCRD: CPT | Performed by: NEUROLOGICAL SURGERY

## 2024-03-12 PROCEDURE — 1125F AMNT PAIN NOTED PAIN PRSNT: CPT | Performed by: NEUROLOGICAL SURGERY

## 2024-03-12 PROCEDURE — 3078F DIAST BP <80 MM HG: CPT | Performed by: NEUROLOGICAL SURGERY

## 2024-03-12 ASSESSMENT — LIFESTYLE VARIABLES: HOW OFTEN DO YOU HAVE A DRINK CONTAINING ALCOHOL: NEVER

## 2024-03-12 ASSESSMENT — PATIENT HEALTH QUESTIONNAIRE - PHQ9
1. LITTLE INTEREST OR PLEASURE IN DOING THINGS: NOT AT ALL
2. FEELING DOWN, DEPRESSED OR HOPELESS: NOT AT ALL
SUM OF ALL RESPONSES TO PHQ9 QUESTIONS 1 AND 2: 0

## 2024-03-12 ASSESSMENT — PAIN SCALES - GENERAL: PAINLEVEL: 7

## 2024-03-12 NOTE — PROGRESS NOTES
[facility] [place]  Department of Neurological Surgery  New Patient Visit    History of Present Illness:  Lizbet Beltrán is a 73 y.o. year old female who presents to the spine clinic with her  to go over her spine conditions.  She has significant neck pain that has made it difficult for her to lift her head to look straight forward.  She also has significant low back pain with radiation into her left leg and down the back of the thigh and front of the thigh.  She also has right knee pain but she says that is because she needs a knee replacement.  She has had numerous back operations over the years.  She has an epidural stimulator in her spine due to her chronic back pain.  That was implanted prior to her last fusion operation.  Her recent CAT scan shows that the screws are all solidly placed in her lumbar spine but the bony connection between the vertebra is minimal.  Prior to her cervical operation she was complaining of balance difficulty and difficulty with numbness and tingling in her hands.  She was found to have severe cord compression.  An anterior cervical operation at 3 levels was performed.  She developed a fusion at C3-4 but she did not fused at C4-5 nor C5-6 and the cages and screw construct at each of those levels has settled into the vertebral bodies and almost destroyed them.      Prior Spine Surgeries: Decompressive lumbar laminectomy Dr. Lovell and then an epidural stimulator; decompression with instrumented fusion L2 L3 L4  and she previously spontaneously fused from L4-L5 and L5-S1, Dr. Hernandez  3 level anterior cervical discectomy with stand-alone screw constructs at C3-4, C4-5, and C5-6 by Dr. Burris    Physical Therapy: Previously  Diabetic: No  Osteoporosis: Yes of the fact that all the implants seem to erode right into the vertebral bodies  Patient's BMI is Body mass index is 36.34 kg/m².    14/14 systems reviewed and negative other than what is listed in the history  of present illness    Patient Active Problem List   Diagnosis    Abdominal pain    Back arthralgia    Chronic radicular pain of lower extremity    Low back pain    Abnormal chest xray    Acute bronchitis    Allergic bronchitis    Allergic rhinitis    Asthmatic bronchitis    Chronic bronchitis (CMS/HCC)    Chronic pansinusitis    Chronic rhinitis    Chronic sinusitis    Cough variant asthma    Simple chronic bronchitis (CMS/HCC)    Acute serous otitis media of both ears    Acute upper respiratory infection    Alternating constipation and diarrhea    Chronic constipation    Chronic diarrhea    Irritable bowel syndrome with diarrhea    Arthralgia of knee, right    Arthritis    Arthropathy of multiple sites    Baker's cyst of knee    Deltoid tendinitis, right    Peripheral neuropathy    Plantar fasciitis    Polyarthritis    Shoulder capsulitis, left    Atypical chest pain    Bilateral cataracts    Bilateral leg cramps    Bilateral lower leg cellulitis    Bladder problem    Breast asymmetry in female    Cellulitis    Cervical neuritis    Cervical stenosis (uterine cervix)    Change in bowel habit    Contusion of right foot    Contusion of right hip    Cough    Throat clearing    Degenerative spondylolisthesis    DJD of right shoulder    Primary osteoarthritis of left shoulder    Ankle edema    Bilateral leg edema    Epicondylitis, lateral, left    Epistaxis    Post-nasal drainage    External hemorrhoids    Facet degeneration of lumbar region    Fatigue    Gastroenteritis    Headache    Hematoma of right lower leg    High vitamin D level    Hyperlipidemia    Increased urinary frequency    Knee effusion, left    Loose stools    Cervical myelopathy (CMS/HCC)    Cervical radiculopathy at C6    Cervical stenosis of spine    Costochondritis    Lumbar radiculopathy    Lumbar stenosis with neurogenic claudication    Lumbosacral stenosis    Osteoarthritis of spine with radiculopathy, cervical region    Cervical strain    Spinal  stenosis    Lung mass    Lung nodule, multiple    Metatarsalgia of left foot    Muscle weakness    Nasal congestion    Obesity    Neoplasm of back    Osteopenia    Overactive bladder    Palpitations    Postoperative stiffness of total knee replacement (CMS/HCC)    Postlaminectomy syndrome, lumbar    Status post lumbar spinal fusion    Thoracic myofascial strain    Thrombocytosis    Thyroiditis    Hypertension    Hypokalemia    History of total left knee replacement    Primary osteoarthritis of right knee    Neuropathy    Obesity, morbid (CMS/HCC)     Past Medical History:   Diagnosis Date    Acute upper respiratory infection, unspecified 08/31/2017    Acute upper respiratory infection    Cellulitis of unspecified part of limb     Cellulitis of lower extremity, unspecified laterality    Long term (current) use of opiate analgesic     Encounter for long-term opiate analgesic use    Personal history of other diseases of the musculoskeletal system and connective tissue     History of spinal stenosis    Personal history of other diseases of the respiratory system     History of sinus problem    Personal history of other endocrine, nutritional and metabolic disease     History of high cholesterol    Spondylosis without myelopathy or radiculopathy, lumbar region 01/14/2021    Facet degeneration of lumbar region    Unspecified osteoarthritis, unspecified site 06/23/2022    Arthritis     Past Surgical History:   Procedure Laterality Date    CATARACT EXTRACTION  03/16/2015    Cataract Surgery    KNEE SURGERY  11/12/2014    Knee Surgery    LUMBAR LAMINECTOMY  01/31/2018    Laminectomy Lumbar    OTHER SURGICAL HISTORY  11/18/2015    Nerve Block Spinal Accessory    TOTAL KNEE ARTHROPLASTY  07/27/2018    Knee Replacement     Social History     Tobacco Use    Smoking status: Former     Types: Cigarettes    Smokeless tobacco: Never   Substance Use Topics    Alcohol use: Never     family history includes Atrial fibrillation in her  sister; Cardiac Disorder in her mother; Colon cancer in her father; Diabetes in an other family member; Hypertension in an other family member; Osteoarthritis in her mother.    Current Outpatient Medications:     albuterol 2.5 mg /3 mL (0.083 %) nebulizer solution, USE 1 UNIT DOSE EVERY 6 HOURS AS NEEDED FOR WHEEZING or shortness of breath., Disp: , Rfl:     albuterol 90 mcg/actuation inhaler, INHALE 1 TO 2 PUFFS EVERY 4 TO 6 HOURS AS NEEDED.  If shortness of breath or wheezing, Disp: , Rfl:     aspirin 81 mg EC tablet, Take 1 tablet (81 mg) by mouth once daily., Disp: , Rfl:     budesonide (Pulmicort) 0.5 mg/2 mL nebulizer solution, USE 1 UNIT DOSE VIA NEBULIZER TWO TIMES A DAY, rinse mouth with water after each use, Disp: 120 mL, Rfl: 11    cetirizine (ZyrTEC) 10 mg tablet, Take 1 tablet (10 mg) by mouth if needed., Disp: , Rfl:     diclofenac sodium 1 % kit, Apply topically 2 times a day as needed., Disp: , Rfl:     furosemide (Lasix) 20 mg tablet, Take 1 tablet (20 mg) by mouth in the morning and 1 tablet (20 mg) before bedtime., Disp: 60 tablet, Rfl: 11    gabapentin (Neurontin) 300 mg capsule, Take 1 capsule (300 mg) by mouth every 8 hours., Disp: 270 capsule, Rfl: 1    hydrocortisone 2.5 % ointment, apply to affected area twice daily for no more than 2 weeks  follow up with aquaphor, Disp: , Rfl:     L. acidophilus/Bifid. animalis 15.5 billion cell capsule, Take by mouth once daily., Disp: , Rfl:     magnesium hydroxide (Milk of Magnesia) 400 mg/5 mL suspension, Take by mouth once daily as needed for constipation., Disp: , Rfl:     multivit-iron-minerals-folic acid (Centrum Silver) 0.4 mg-300 mcg- 250 mcg tab, Take 1 tablet by mouth once daily., Disp: , Rfl:     multivitamin with minerals (multivit-min-iron fum-folic ac) tablet, Take 1 tablet by mouth once daily., Disp: , Rfl:     nebulizer accessories kit, 1 kit once daily., Disp: 1 each, Rfl: 0    pantoprazole (ProtoNix) 40 mg EC tablet, Take 1 tablet (40  mg) by mouth once daily in the morning. Take before meals., Disp: 90 tablet, Rfl: 3    potassium chloride CR 10 mEq ER tablet, TAKE ONE TABLET BY MOUTH EVERY MORNING AND TAKE ONE TABLET BEFORE BED. DO NOT CRUSH, CHEW OR SPLIT., Disp: 60 tablet, Rfl: 0    rosuvastatin (Crestor) 20 mg tablet, Take 1 tablet (20 mg) by mouth once daily. DISCONTINUE SIMVASTATIN, Disp: 90 tablet, Rfl: 3    tiZANidine (Zanaflex) 4 mg tablet, Take 1 tablet (4 mg) by mouth every 6 hours if needed for muscle spasms., Disp: , Rfl:     traMADol (Ultram) 50 mg tablet, Take 1 tablet (50 mg) by mouth every 8 hours if needed for severe pain (7 - 10)., Disp: 90 tablet, Rfl: 1    valsartan (Diovan) 80 mg tablet, Take 1 tablet (80 mg) by mouth once daily., Disp: 90 tablet, Rfl: 3    albuterol 2.5 mg /3 mL (0.083 %) nebulizer solution, Inhale., Disp: , Rfl:     albuterol 90 mcg/actuation inhaler, Inhale., Disp: , Rfl:     desonide (DesOwen) 0.05 % ointment, USE AS DIRECTED TWICE DAILY FOR 2 WEEKS, Disp: , Rfl:     fluconazole (Diflucan) 150 mg tablet, TAKE ONE TABLET BY MOUTH FOR 1 DAY  THEN REPEAT IN 1 WEEK, Disp: , Rfl:     ketoconazole (NIZOral) 2 % cream, Apply daily to the fungal infection (Patient not taking: Reported on 3/12/2024), Disp: 30 g, Rfl: 2    ketoconazole (NIZOral) 2 % shampoo, USE 2 TIMES A WEEK, Disp: , Rfl:     melatonin 5 mg tablet, Take by mouth., Disp: , Rfl:     mirabegron (Myrbetriq) 50 mg tablet extended release 24 hr 24 hr tablet, Take 1 tablet (50 mg) by mouth once daily., Disp: , Rfl:   Allergies   Allergen Reactions    Nickel Itching    Codeine Unknown and Nausea/vomiting    Pyrilamine-Phenylephrin-Dm Tan Palpitations       Physical Examination      General: NAD, AOx 3,  no aphasia or dysarthria, normal fund of knowledge  Cranial Nerves II-XII: VFF, PERRL, EOMI, Face Symm, Facial SILT, Palate/Tongue midline and symmetric  Motor: 5/5 Throughout all extremities,  No drift, no dysmetria on finger to nose  Sensation: SILT and  PP throughout all extremities, she has hyper sensitivity and dysesthesia in her thighs both inner thighs and outer thighs  DTRS: 3-4+ Throughout with significant spread and bilateral Lane's,     Results    I personally reviewed and interpreted the imaging results which included recent CAT scan and MRI of the lumbar spine which shows that the screws are solid at L2-L3 and L4 but I do not see bony connection and the cages have settled inside the disc spaces.  I do see a fusion between L4 and L5 and L5 and S1.  I can also see evidence of the epidural stimulator.  In the cervical spine she has developed a kyphotic deformity based on the MRI and the CAT scan and plain x-rays.  The canal is compromised but it is not as compromised as it had been prior to her last operation on the cervical spine.    Assessment and Plan:    Lizbet Beltrán is a 73 y.o. year old female who presents to the spine clinic with significant pain following cervical and lumbar operations.  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  I told her that it is a much more complicated situation and I can handle at my [facility].  I told her that I was thinking something like a multilevel corpectomy done from in front and then that day or the next day coming in from behind and fusing her and decompressing her.  I told her despite the fact that I told her about the operation I did not necessarily think she should have it because if her bones are too soft we might be setting her up for a bigger failure than what she has now.  I did however tell her to start wearing her Tensas J collar and I am getting her set up with the bone growth stimulator.  I also told her that she can get multiple additional opinions.  I gave her my colleagues' names and I suggested she go back to see her orthopedic surgeon if she does want to consider the surgical options.      I have reviewed all prior documentation and reviewed the electronic  medical record since admission. I have personally have reviewed all advanced imaging not just the reports and used my interpretation as documented as the relevant findings. I have reviewed the risks and benefits of all treatment recommendations listed in this note with the patient and family. I spent a total of 60 minutes in service to this patient's care during this date of service.      The above clinical summary has been dictated with voice recognition software. It has not been proofread for grammatical errors, typographical mistakes, or other semantic inconsistencies.    Thank you for visiting our office today. It was our pleasure to take part in your healthcare.     Do not hesitate to call with any questions regarding your plan of care after leaving. My office can be reached at [phone] M-F 8am-4pm.     To clinicians, thank you very much for this kind referral. It is a privilege to partner with you in the care of your patients. My office would be delighted to assist you with any further consultations or with questions regarding the plan of care outlined. Do not hesitate to call the office or contact me directly.     Sincerely,    Negrito Louie MD, FAANS, FACS  Board Certified Neurological Surgeon  , Department of Neurological Surgery  [employer] [facility] [employer]    [facility]  [address]  [facility] [address]  [place] [address]    [facility]  [address]  [address]  [place] [address]    Phone: [phone]  Fax: [phone]

## 2024-03-13 ENCOUNTER — APPOINTMENT (OUTPATIENT)
Dept: RADIOLOGY | Facility: CLINIC | Age: 74
End: 2024-03-13
Payer: MEDICARE

## 2024-03-13 ENCOUNTER — APPOINTMENT (OUTPATIENT)
Dept: PAIN MEDICINE | Facility: CLINIC | Age: 74
End: 2024-03-13
Payer: MEDICARE

## 2024-03-14 ENCOUNTER — APPOINTMENT (OUTPATIENT)
Dept: RADIOLOGY | Facility: CLINIC | Age: 74
End: 2024-03-14
Payer: MEDICARE

## 2024-03-14 ENCOUNTER — APPOINTMENT (OUTPATIENT)
Dept: RADIOLOGY | Facility: HOSPITAL | Age: 74
End: 2024-03-14
Payer: MEDICARE

## 2024-03-28 ENCOUNTER — OFFICE VISIT (OUTPATIENT)
Dept: PAIN MEDICINE | Facility: CLINIC | Age: 74
End: 2024-03-28
Payer: MEDICARE

## 2024-03-28 VITALS
HEIGHT: 68 IN | DIASTOLIC BLOOD PRESSURE: 65 MMHG | RESPIRATION RATE: 18 BRPM | OXYGEN SATURATION: 99 % | WEIGHT: 235 LBS | HEART RATE: 71 BPM | BODY MASS INDEX: 35.61 KG/M2 | SYSTOLIC BLOOD PRESSURE: 142 MMHG | TEMPERATURE: 96.6 F

## 2024-03-28 DIAGNOSIS — M96.1 POSTLAMINECTOMY SYNDROME, LUMBAR: ICD-10-CM

## 2024-03-28 DIAGNOSIS — M54.16 LUMBAR RADICULOPATHY: ICD-10-CM

## 2024-03-28 DIAGNOSIS — M47.816 FACET DEGENERATION OF LUMBAR REGION: Primary | ICD-10-CM

## 2024-03-28 DIAGNOSIS — M48.062 LUMBAR STENOSIS WITH NEUROGENIC CLAUDICATION: ICD-10-CM

## 2024-03-28 DIAGNOSIS — M48.07 LUMBOSACRAL STENOSIS: ICD-10-CM

## 2024-03-28 DIAGNOSIS — M16.9 OSTEOARTHROSIS, HIP: ICD-10-CM

## 2024-03-28 DIAGNOSIS — M54.12 CERVICAL NEURITIS: ICD-10-CM

## 2024-03-28 PROCEDURE — 99213 OFFICE O/P EST LOW 20 MIN: CPT | Performed by: ANESTHESIOLOGY

## 2024-03-28 PROCEDURE — 1159F MED LIST DOCD IN RCRD: CPT | Performed by: ANESTHESIOLOGY

## 2024-03-28 PROCEDURE — 3078F DIAST BP <80 MM HG: CPT | Performed by: ANESTHESIOLOGY

## 2024-03-28 PROCEDURE — 1160F RVW MEDS BY RX/DR IN RCRD: CPT | Performed by: ANESTHESIOLOGY

## 2024-03-28 PROCEDURE — 3077F SYST BP >= 140 MM HG: CPT | Performed by: ANESTHESIOLOGY

## 2024-03-28 PROCEDURE — 1036F TOBACCO NON-USER: CPT | Performed by: ANESTHESIOLOGY

## 2024-03-28 PROCEDURE — 1125F AMNT PAIN NOTED PAIN PRSNT: CPT | Performed by: ANESTHESIOLOGY

## 2024-03-28 RX ORDER — NALOXONE HYDROCHLORIDE 4 MG/.1ML
4 SPRAY NASAL AS NEEDED
Qty: 2 EACH | Refills: 0 | Status: SHIPPED | OUTPATIENT
Start: 2024-03-28 | End: 2024-04-24 | Stop reason: ALTCHOICE

## 2024-03-28 RX ORDER — TRAMADOL HYDROCHLORIDE 50 MG/1
50 TABLET ORAL EVERY 8 HOURS PRN
Qty: 90 TABLET | Refills: 1 | Status: SHIPPED | OUTPATIENT
Start: 2024-03-28 | End: 2024-05-16 | Stop reason: SDUPTHER

## 2024-03-28 ASSESSMENT — ENCOUNTER SYMPTOMS
LOSS OF SENSATION IN FEET: 0
DEPRESSION: 0
OCCASIONAL FEELINGS OF UNSTEADINESS: 1

## 2024-03-28 ASSESSMENT — PAIN SCALES - GENERAL: PAINLEVEL: 7

## 2024-03-28 NOTE — H&P
History Of Present Illness  Lizbet Beltrán is a 73 y.o. female presenting with lumbosacral radiculopathy as well as is having problems with her neck surgery.  Patient recently saw Dr. Negrito Louie who states that her previous cervical laminectomy and fusion is loosening and she needs to be on a bone stimulator for 1 year.  She is been talking about seeing another surgeon however she is risk averse of having the neck surgery again.  She takes tramadol 50 mg tablets 3 times a day and has a pill count today of 40 tablets..     Past Medical History  Past Medical History:   Diagnosis Date    Acute upper respiratory infection, unspecified 08/31/2017    Acute upper respiratory infection    Cellulitis of unspecified part of limb     Cellulitis of lower extremity, unspecified laterality    Long term (current) use of opiate analgesic     Encounter for long-term opiate analgesic use    Personal history of other diseases of the musculoskeletal system and connective tissue     History of spinal stenosis    Personal history of other diseases of the respiratory system     History of sinus problem    Personal history of other endocrine, nutritional and metabolic disease     History of high cholesterol    Spondylosis without myelopathy or radiculopathy, lumbar region 01/14/2021    Facet degeneration of lumbar region    Unspecified osteoarthritis, unspecified site 06/23/2022    Arthritis       Surgical History  Past Surgical History:   Procedure Laterality Date    CATARACT EXTRACTION  03/16/2015    Cataract Surgery    KNEE SURGERY  11/12/2014    Knee Surgery    LUMBAR LAMINECTOMY  01/31/2018    Laminectomy Lumbar    OTHER SURGICAL HISTORY  11/18/2015    Nerve Block Spinal Accessory    TOTAL KNEE ARTHROPLASTY  07/27/2018    Knee Replacement        Social History  She reports that she has quit smoking. Her smoking use included cigarettes. She has never used smokeless tobacco. She reports that she does not drink alcohol and does  "not use drugs.    Family History  Family History   Problem Relation Name Age of Onset    Other (Cardiac Disorder) Mother      Osteoarthritis Mother      Colon cancer Father      Atrial fibrillation Sister      Hypertension Other      Diabetes Other gm         Allergies  Nickel, Codeine, and Pyrilamine-phenylephrin-dm tan    Review of Systems  Patient complains of neck pain as well as lumbosacral radiculopathy  Physical Exam  Gen. appearance:  Patient's alert and oriented ×3 ;cooperative mild to moderate distress  Heart: Regular rate and rhythm  Lungs: Clear to auscultation  Abdomen: Soft nontender  Neuro: Cranial nerves II -XII intact; DTR: +2 over 4 biceps triceps brachial radialis patellar and Achilles  Spinal exam: Positive paraspinal tenderness noted bilaterally L4 5 L5-S1 with flexion extension and rotation/no bony abnormalities  Musculoskeletal:  Upper and lower extremity strength was 4/5 bilaterally  :  deferred  Skin: Warm and dry      Last Recorded Vitals  Blood pressure 142/65, pulse 71, temperature 35.9 °C (96.6 °F), resp. rate 18, height 1.727 m (5' 8\"), weight 107 kg (235 lb), SpO2 99 %.    Relevant Results         Assessment/Plan   Diagnoses and all orders for this visit:  Facet degeneration of lumbar region  Lumbosacral stenosis  -     traMADol (Ultram) 50 mg tablet; Take 1 tablet (50 mg) by mouth every 8 hours if needed for severe pain (7 - 10).  -     naloxone (Narcan) 4 mg/0.1 mL nasal spray; Administer 1 spray (4 mg) into affected nostril(s) if needed for opioid reversal. May repeat every 2-3 minutes if needed, alternating nostrils, until medical assistance becomes available.  Lumbar stenosis with neurogenic claudication  Lumbar radiculopathy  -     traMADol (Ultram) 50 mg tablet; Take 1 tablet (50 mg) by mouth every 8 hours if needed for severe pain (7 - 10).  Cervical neuritis  -     traMADol (Ultram) 50 mg tablet; Take 1 tablet (50 mg) by mouth every 8 hours if needed for severe pain (7 - " 10).  Postlaminectomy syndrome, lumbar  -     traMADol (Ultram) 50 mg tablet; Take 1 tablet (50 mg) by mouth every 8 hours if needed for severe pain (7 - 10).  -     naloxone (Narcan) 4 mg/0.1 mL nasal spray; Administer 1 spray (4 mg) into affected nostril(s) if needed for opioid reversal. May repeat every 2-3 minutes if needed, alternating nostrils, until medical assistance becomes available.  Osteoarthrosis, hip  -     traMADol (Ultram) 50 mg tablet; Take 1 tablet (50 mg) by mouth every 8 hours if needed for severe pain (7 - 10).    Patient scheduled for a second caudal epidural block on May 17, 2024.  She was e-prescribed tramadol 90 tablets and 1 refill.  She was told to take all medication as directed and keep a secure location at all times.       I spent 20 minutes in the professional and overall care of this patient.      Brian Wang, DO

## 2024-03-28 NOTE — PROGRESS NOTES
Pain #7/10 to her lower back, left thigh going down the leg to the knee.  Caudal Block #1 on 2-15-24 gave her at least 50% sustained pain relief.  Is scheduled for #2 in May.  Takes 3 Tramadol per day and has 40 tablets left.  Takes Gabapentin 2 capsules, TID. These medications help reduce her pain depending on the day.

## 2024-03-29 ENCOUNTER — APPOINTMENT (OUTPATIENT)
Dept: NEUROSURGERY | Facility: CLINIC | Age: 74
End: 2024-03-29
Payer: MEDICARE

## 2024-04-24 ENCOUNTER — OFFICE VISIT (OUTPATIENT)
Dept: PRIMARY CARE | Facility: CLINIC | Age: 74
End: 2024-04-24
Payer: MEDICARE

## 2024-04-24 VITALS
BODY MASS INDEX: 36.83 KG/M2 | WEIGHT: 243 LBS | HEIGHT: 68 IN | HEART RATE: 71 BPM | DIASTOLIC BLOOD PRESSURE: 78 MMHG | TEMPERATURE: 97.7 F | OXYGEN SATURATION: 94 % | RESPIRATION RATE: 18 BRPM | SYSTOLIC BLOOD PRESSURE: 150 MMHG

## 2024-04-24 DIAGNOSIS — M12.9 ARTHROPATHY OF MULTIPLE SITES: ICD-10-CM

## 2024-04-24 DIAGNOSIS — G95.9 CERVICAL MYELOPATHY (MULTI): ICD-10-CM

## 2024-04-24 DIAGNOSIS — G62.9 PERIPHERAL POLYNEUROPATHY: ICD-10-CM

## 2024-04-24 DIAGNOSIS — E66.01 OBESITY, MORBID (MULTI): ICD-10-CM

## 2024-04-24 DIAGNOSIS — M96.1 POSTLAMINECTOMY SYNDROME, LUMBAR: ICD-10-CM

## 2024-04-24 DIAGNOSIS — J45.40 MODERATE PERSISTENT ASTHMATIC BRONCHITIS WITHOUT COMPLICATION (HHS-HCC): ICD-10-CM

## 2024-04-24 DIAGNOSIS — N32.81 OVERACTIVE BLADDER: ICD-10-CM

## 2024-04-24 DIAGNOSIS — R09.89 LABILE HYPERTENSION: Primary | ICD-10-CM

## 2024-04-24 DIAGNOSIS — G62.9 NEUROPATHY: ICD-10-CM

## 2024-04-24 DIAGNOSIS — M25.561 ARTHRALGIA OF KNEE, RIGHT: ICD-10-CM

## 2024-04-24 PROCEDURE — 3077F SYST BP >= 140 MM HG: CPT | Performed by: FAMILY MEDICINE

## 2024-04-24 PROCEDURE — 1036F TOBACCO NON-USER: CPT | Performed by: FAMILY MEDICINE

## 2024-04-24 PROCEDURE — 1159F MED LIST DOCD IN RCRD: CPT | Performed by: FAMILY MEDICINE

## 2024-04-24 PROCEDURE — 1126F AMNT PAIN NOTED NONE PRSNT: CPT | Performed by: FAMILY MEDICINE

## 2024-04-24 PROCEDURE — 3078F DIAST BP <80 MM HG: CPT | Performed by: FAMILY MEDICINE

## 2024-04-24 PROCEDURE — 99214 OFFICE O/P EST MOD 30 MIN: CPT | Performed by: FAMILY MEDICINE

## 2024-04-24 PROCEDURE — 1160F RVW MEDS BY RX/DR IN RCRD: CPT | Performed by: FAMILY MEDICINE

## 2024-04-24 RX ORDER — LIDOCAINE 50 MG/G
1 PATCH TOPICAL DAILY
Qty: 30 PATCH | Refills: 3 | Status: SHIPPED | OUTPATIENT
Start: 2024-04-24 | End: 2025-04-24

## 2024-04-24 ASSESSMENT — PAIN SCALES - GENERAL: PAINLEVEL: 0-NO PAIN

## 2024-04-24 NOTE — H&P (VIEW-ONLY)
Subjective   Lizbet Beltrán is a 73 y.o. female who presents for Follow-up (Follow up visit today).    HPI  : The patient is a 73-year-old female who is in for a follow-up visit, and recheck on blood pressure, review of medication and she needs a refill on her lidocaine patches.  Patient recently saw neurosurgery regarding her neck problems and her arm weakness and apparently she has a screw that has not stayed in place and the healing process after her cervical fusion has not completely healed due to her osteoporotic bones.  She was started on a bone stimulator to help with the bone growth and healing process.  Patient also has an appointment in 3 weeks with pain management for some nerve blocks for her left sciatica and she also receives tramadol for pain.  Patient has been through a lot of surgery in the last 2 to 3 years   and she really does not want any more surgical intervention.  She had to have a revision on her left knee and she also had the cervical spine fusion and a lumbosacral spine fusion.    Objective   : ROS :10 systems were reviewed and the information is included in the HPI and no additional review of systems is indicated.    Physical Exam  Vitals and nursing note reviewed.   Constitutional:       Appearance: Normal appearance. She is obese.      Comments: Patient is alert and oriented x3.   No acute distress but does have chronic pain from previous surgeries and also her severe arthritis.   HENT:      Head: Normocephalic.      Right Ear: Tympanic membrane and external ear normal.      Left Ear: Tympanic membrane and external ear normal.      Ears:      Comments: Ears are patent bilaterally and TMs are clear.     Nose: Nose normal.      Mouth/Throat:      Mouth: Mucous membranes are moist.      Pharynx: Oropharynx is clear.      Comments: Mouth is moist, tongue is midline.  No posterior pharyngeal erythema.  Eyes:      Extraocular Movements: Extraocular movements intact.       Conjunctiva/sclera: Conjunctivae normal.      Pupils: Pupils are equal, round, and reactive to light.      Comments: No visual disturbance, does have her eyes examined once a year.   Neck:      Comments: Using bone growth stimulator.  Previous cervical fusion and has a loose  screw.  Poor bone healing due to osteoporosis.  No carotid bruits, no thyromegaly, no cervical adenopathy.  Occasional neck spasm and restriction of motion secondary to stress and tension.  Saw Neurosurgeon , Dr. Louie.   Cardiovascular:      Rate and Rhythm: Normal rate and regular rhythm.      Pulses: Normal pulses.      Heart sounds: Normal heart sounds.      Comments: Patient denies chest pain and no palpitations.  Heart rhythm is stable S1 and S2 are noted, no ectopics.  Pulmonary:      Effort: Pulmonary effort is normal.      Comments: Patient does use a home nebulizer machine for asthmatic bronchitis and does follow with pulmonary medicine.  Currently she is clear and stable.  Patient denies any coughing or wheezing.  Lungs are clear to auscultation.    Abdominal:      General: Abdomen is flat. Bowel sounds are normal.      Palpations: Abdomen is soft.      Comments: Abdomen is soft and nontender, no hepatosplenomegaly.  No flank tenderness.  No suprapubic pain.  Positive bowel sounds x4.  No abdominal guarding and no rebound tenderness.   Genitourinary:     Comments: Patient is following with urology due to overactive bladder problems and she is receiving Botox every several months.  She states it does help during the day but she still has some leakage at night.  Musculoskeletal:         General: Tenderness present. Normal range of motion.      Cervical back: Normal range of motion.      Comments: Cervical thoracic pains.  Post lumbar fusion.   Post revision of left total knee.  Patient also has right shoulder discomfort and left hip and thigh discomfort and she is following with pain management.  She has arthritis in most of her  joints and has to ambulate with a wheeled walker.  Is also using a bone stimulator on her cervical region that was prescribed by neurosurgery.   Skin:     General: Skin is warm.      Comments: There is no bruising, no erythema, no skin lesions noted, no rashes.   Neurological:      Mental Status: She is alert and oriented to person, place, and time. Mental status is at baseline.      Sensory: Sensory deficit present.      Motor: Weakness present.      Coordination: Coordination abnormal.      Gait: Gait abnormal.      Comments: No focal neurosensory deficits are noted.  Patient denies any peripheral neuropathy.  Coordination and gait are stable.  Normal muscle strength upper and lower extremities.   Psychiatric:         Mood and Affect: Mood normal.         Behavior: Behavior normal.         Thought Content: Thought content normal.         Judgment: Judgment normal.      Comments: Patient has normal mood and affect but does have some anxiety concerning health issues.  Thought content and judgment are stable.  No signs of vascular dementia.  Behavior is normal.     PLAN : Patient is a 73-year-old female who has hypertension, arthritis in all her joints and has had cervical and lumbar spinal fusions.  She is still following with neurosurgery and is now using a bone stimulator to help with the healing process especially in her cervical region.  She also follows with pain management and has had several nerve blocks and has an appointment in 3 weeks.  We did review her medications and she does have borderline hypertension in spite of her medications.  We will continue to monitor that and she will return in 4 months for recheck.  She also needed a refill on her lidocaine patches which she will get from the pharmacy.  Patient otherwise seems stable and will follow-up in 4 months.    Problem List Items Addressed This Visit    None           Vito Ordonez,

## 2024-05-09 ENCOUNTER — TELEPHONE (OUTPATIENT)
Dept: PAIN MEDICINE | Facility: CLINIC | Age: 74
End: 2024-05-09
Payer: MEDICARE

## 2024-05-09 NOTE — TELEPHONE ENCOUNTER
PT WANTS TO KNOW WHEN SHOULD SHE STOP THE BABY ASPIRIN AND DICLOFENAC. HAS INJECTION ON 05/16.    Pt called again

## 2024-05-16 ENCOUNTER — HOSPITAL ENCOUNTER (OUTPATIENT)
Dept: PAIN MEDICINE | Facility: CLINIC | Age: 74
Discharge: HOME | End: 2024-05-16
Payer: MEDICARE

## 2024-05-16 ENCOUNTER — HOSPITAL ENCOUNTER (OUTPATIENT)
Dept: RADIOLOGY | Facility: CLINIC | Age: 74
Discharge: HOME | End: 2024-05-16
Payer: MEDICARE

## 2024-05-16 VITALS
HEART RATE: 68 BPM | HEIGHT: 68 IN | RESPIRATION RATE: 18 BRPM | OXYGEN SATURATION: 95 % | DIASTOLIC BLOOD PRESSURE: 71 MMHG | TEMPERATURE: 97.2 F | SYSTOLIC BLOOD PRESSURE: 145 MMHG | BODY MASS INDEX: 35.61 KG/M2 | WEIGHT: 235 LBS

## 2024-05-16 DIAGNOSIS — M54.16 LUMBAR RADICULOPATHY: ICD-10-CM

## 2024-05-16 DIAGNOSIS — M54.12 CERVICAL NEURITIS: ICD-10-CM

## 2024-05-16 DIAGNOSIS — M96.1 POSTLAMINECTOMY SYNDROME, LUMBAR: Primary | ICD-10-CM

## 2024-05-16 DIAGNOSIS — M25.511 CHRONIC RIGHT SHOULDER PAIN: ICD-10-CM

## 2024-05-16 DIAGNOSIS — M48.062 LUMBAR STENOSIS WITH NEUROGENIC CLAUDICATION: ICD-10-CM

## 2024-05-16 DIAGNOSIS — M16.9 OSTEOARTHROSIS, HIP: ICD-10-CM

## 2024-05-16 DIAGNOSIS — Z98.1 STATUS POST LUMBAR SPINAL FUSION: ICD-10-CM

## 2024-05-16 DIAGNOSIS — G89.29 CHRONIC RIGHT SHOULDER PAIN: ICD-10-CM

## 2024-05-16 DIAGNOSIS — M48.07 LUMBOSACRAL STENOSIS: ICD-10-CM

## 2024-05-16 PROCEDURE — 7100000010 HC PHASE TWO TIME - EACH INCREMENTAL 1 MINUTE

## 2024-05-16 PROCEDURE — 2500000004 HC RX 250 GENERAL PHARMACY W/ HCPCS (ALT 636 FOR OP/ED)

## 2024-05-16 PROCEDURE — 62323 NJX INTERLAMINAR LMBR/SAC: CPT | Performed by: ANESTHESIOLOGY

## 2024-05-16 PROCEDURE — 2500000005 HC RX 250 GENERAL PHARMACY W/O HCPCS

## 2024-05-16 PROCEDURE — 2500000001 HC RX 250 WO HCPCS SELF ADMINISTERED DRUGS (ALT 637 FOR MEDICARE OP)

## 2024-05-16 PROCEDURE — 7100000009 HC PHASE TWO TIME - INITIAL BASE CHARGE

## 2024-05-16 RX ORDER — CEPHALEXIN 500 MG/1
CAPSULE ORAL
Status: COMPLETED
Start: 2024-05-16 | End: 2024-05-16

## 2024-05-16 RX ORDER — CEPHALEXIN 500 MG/1
500 CAPSULE ORAL ONCE
Status: SHIPPED | OUTPATIENT
Start: 2024-05-16

## 2024-05-16 RX ORDER — LIDOCAINE HYDROCHLORIDE 5 MG/ML
INJECTION, SOLUTION INFILTRATION; INTRAVENOUS
Status: COMPLETED
Start: 2024-05-16 | End: 2024-05-16

## 2024-05-16 RX ORDER — METHYLPREDNISOLONE ACETATE 80 MG/ML
INJECTION, SUSPENSION INTRA-ARTICULAR; INTRALESIONAL; INTRAMUSCULAR; SOFT TISSUE
Status: COMPLETED
Start: 2024-05-16 | End: 2024-05-16

## 2024-05-16 RX ORDER — TRAMADOL HYDROCHLORIDE 50 MG/1
50 TABLET ORAL EVERY 8 HOURS PRN
Qty: 90 TABLET | Refills: 1 | Status: SHIPPED | OUTPATIENT
Start: 2024-05-16 | End: 2024-06-15

## 2024-05-16 RX ADMIN — LIDOCAINE HYDROCHLORIDE 250 MG: 5 INJECTION, SOLUTION INFILTRATION at 11:11

## 2024-05-16 RX ADMIN — METHYLPREDNISOLONE ACETATE 80 MG: 80 INJECTION, SUSPENSION INTRA-ARTICULAR; INTRALESIONAL; INTRAMUSCULAR; SOFT TISSUE at 11:11

## 2024-05-16 RX ADMIN — CEPHALEXIN 500 MG: 500 CAPSULE ORAL at 10:45

## 2024-05-16 ASSESSMENT — ENCOUNTER SYMPTOMS
OCCASIONAL FEELINGS OF UNSTEADINESS: 1
DEPRESSION: 0
LOSS OF SENSATION IN FEET: 1

## 2024-05-16 ASSESSMENT — PAIN SCALES - GENERAL
PAINLEVEL_OUTOF10: 7
PAINLEVEL_OUTOF10: 0 - NO PAIN

## 2024-05-16 ASSESSMENT — PAIN - FUNCTIONAL ASSESSMENT: PAIN_FUNCTIONAL_ASSESSMENT: 0-10

## 2024-05-16 ASSESSMENT — PAIN DESCRIPTION - DESCRIPTORS: DESCRIPTORS: ACHING

## 2024-05-16 NOTE — Clinical Note
Prepped with ChloraPrep, a minimum of 3 minute dry time, longer if needed, no pooling noted, patient draped in sterile fashion. Caudal epidural steroid injection

## 2024-05-16 NOTE — OP NOTE
Date: 2024  OR Location: [facility] NON-OR PROCEDURES    Name: Lizbet Beltrán, : 1950, Age: 73 y.o., MRN: [id number], Sex: female    Diagnosis   1. Postlaminectomy syndrome, lumbar        2. Lumbar stenosis with neurogenic claudication  Epidural Steroid Injection    Epidural Steroid Injection      3. Lumbosacral stenosis          Preprocedure;  Patient's airway was reevaluated preoperatively prior to receiving IV conscious sedation.  No anatomical adverse airway conditions were noted.  A Mallampati score of 2 was assigned to the patient.  ASA : II    Clinical Note: The patient is a 73 y.o. year-old female who is here today to undergo caudal epidural block number 2  under local anesthesia.  The patient and has a signed consent on file for today´s ERI.   Preop/postop diagnosis:   1. Postlaminectomy syndrome, lumbar        2. Lumbar stenosis with neurogenic claudication  Epidural Steroid Injection    Epidural Steroid Injection      3. Lumbosacral stenosis           Anesthesia: local  Procedure: Caudal epidural steroid injection  Procedures    The patient was placed in a prone position on the OR table, sterile prep and drape of lower lumbar spine was done with Betadine and ChloraPrep X 3.  A sterile 22-gauge 1-1/2 inch sterile spinal needle was inserted into the caudal canal. Isovue 2ml outlined the epidural space, as seen on a cross table lateral view with fluoroscopy which was followed by 80mg of Depo Medrol and 4ml of  0.5% Xylocaine plain.  The needle was then removed and a sterile bandage was applied over the puncture site with Neosporin ointment.  The patient was able to tolerate the procedure well and was taken to the recovery room in stable, satisfactory condition with no neurological deficit. Patient is scheduled for a follow up visit in 1 month.

## 2024-05-17 ENCOUNTER — HOSPITAL ENCOUNTER (OUTPATIENT)
Dept: NEUROLOGY | Facility: HOSPITAL | Age: 74
Discharge: HOME | End: 2024-05-17
Payer: MEDICARE

## 2024-05-17 DIAGNOSIS — M96.1 POSTLAMINECTOMY SYNDROME, NOT ELSEWHERE CLASSIFIED: ICD-10-CM

## 2024-05-17 PROCEDURE — 95911 NRV CNDJ TEST 9-10 STUDIES: CPT | Performed by: PSYCHIATRY & NEUROLOGY

## 2024-05-17 PROCEDURE — 95886 MUSC TEST DONE W/N TEST COMP: CPT | Performed by: PSYCHIATRY & NEUROLOGY

## 2024-05-20 ENCOUNTER — HOSPITAL ENCOUNTER (OUTPATIENT)
Dept: RADIOLOGY | Facility: CLINIC | Age: 74
Discharge: HOME | End: 2024-05-20
Payer: MEDICARE

## 2024-05-20 DIAGNOSIS — Z87.891 HISTORY OF NICOTINE DEPENDENCE: ICD-10-CM

## 2024-05-20 PROCEDURE — 71271 CT THORAX LUNG CANCER SCR C-: CPT

## 2024-06-05 DIAGNOSIS — E78.5 DYSLIPIDEMIA: ICD-10-CM

## 2024-06-05 RX ORDER — ROSUVASTATIN CALCIUM 20 MG/1
TABLET, COATED ORAL
Qty: 90 TABLET | Refills: 3 | Status: SHIPPED | OUTPATIENT
Start: 2024-06-05

## 2024-06-12 ENCOUNTER — HOSPITAL ENCOUNTER (OUTPATIENT)
Dept: PAIN MEDICINE | Facility: CLINIC | Age: 74
Discharge: HOME | End: 2024-06-12
Payer: MEDICARE

## 2024-06-12 ENCOUNTER — HOSPITAL ENCOUNTER (OUTPATIENT)
Dept: RADIOLOGY | Facility: CLINIC | Age: 74
Discharge: HOME | End: 2024-06-12
Payer: MEDICARE

## 2024-06-12 VITALS
OXYGEN SATURATION: 97 % | HEART RATE: 70 BPM | DIASTOLIC BLOOD PRESSURE: 74 MMHG | WEIGHT: 235 LBS | TEMPERATURE: 97.5 F | BODY MASS INDEX: 35.61 KG/M2 | HEIGHT: 68 IN | RESPIRATION RATE: 18 BRPM | SYSTOLIC BLOOD PRESSURE: 145 MMHG

## 2024-06-12 DIAGNOSIS — M48.07 LUMBOSACRAL STENOSIS: ICD-10-CM

## 2024-06-12 DIAGNOSIS — G89.29 CHRONIC RIGHT SHOULDER PAIN: ICD-10-CM

## 2024-06-12 DIAGNOSIS — M54.16 LUMBAR RADICULOPATHY: ICD-10-CM

## 2024-06-12 DIAGNOSIS — M54.12 CERVICAL NEURITIS: ICD-10-CM

## 2024-06-12 DIAGNOSIS — M25.511 CHRONIC RIGHT SHOULDER PAIN: ICD-10-CM

## 2024-06-12 DIAGNOSIS — M16.9 OSTEOARTHROSIS, HIP: ICD-10-CM

## 2024-06-12 DIAGNOSIS — M19.011 PRIMARY OSTEOARTHRITIS OF RIGHT SHOULDER: Primary | ICD-10-CM

## 2024-06-12 DIAGNOSIS — M96.1 POSTLAMINECTOMY SYNDROME, LUMBAR: ICD-10-CM

## 2024-06-12 PROCEDURE — 2500000005 HC RX 250 GENERAL PHARMACY W/O HCPCS

## 2024-06-12 PROCEDURE — 2500000001 HC RX 250 WO HCPCS SELF ADMINISTERED DRUGS (ALT 637 FOR MEDICARE OP): Performed by: ANESTHESIOLOGY

## 2024-06-12 PROCEDURE — 2500000004 HC RX 250 GENERAL PHARMACY W/ HCPCS (ALT 636 FOR OP/ED)

## 2024-06-12 RX ORDER — CEPHALEXIN 500 MG/1
CAPSULE ORAL
Status: DISCONTINUED
Start: 2024-06-12 | End: 2024-06-12 | Stop reason: HOSPADM

## 2024-06-12 RX ORDER — ASPIRIN 81 MG/1
81 TABLET ORAL DAILY
COMMUNITY

## 2024-06-12 RX ORDER — CEPHALEXIN 500 MG/1
500 CAPSULE ORAL ONCE
Status: DISCONTINUED | OUTPATIENT
Start: 2024-06-12 | End: 2024-06-13 | Stop reason: HOSPADM

## 2024-06-12 RX ORDER — DICLOFENAC SODIUM AND MISOPROSTOL 75; 200 MG/1; UG/1
1 TABLET, DELAYED RELEASE ORAL DAILY
COMMUNITY

## 2024-06-12 RX ORDER — TRAMADOL HYDROCHLORIDE 50 MG/1
50 TABLET ORAL EVERY 8 HOURS PRN
Qty: 90 TABLET | Refills: 1 | Status: SHIPPED | OUTPATIENT
Start: 2024-06-12

## 2024-06-12 RX ORDER — LIDOCAINE HYDROCHLORIDE 20 MG/ML
INJECTION, SOLUTION EPIDURAL; INFILTRATION; INTRACAUDAL; PERINEURAL
Status: COMPLETED
Start: 2024-06-12 | End: 2024-06-12

## 2024-06-12 RX ORDER — TRIAMCINOLONE ACETONIDE 40 MG/ML
INJECTION, SUSPENSION INTRA-ARTICULAR; INTRAMUSCULAR
Status: COMPLETED
Start: 2024-06-12 | End: 2024-06-12

## 2024-06-12 ASSESSMENT — PAIN SCALES - GENERAL
PAINLEVEL_OUTOF10: 7
PAINLEVEL_OUTOF10: 7

## 2024-06-12 ASSESSMENT — ENCOUNTER SYMPTOMS
LOSS OF SENSATION IN FEET: 1
DEPRESSION: 0
OCCASIONAL FEELINGS OF UNSTEADINESS: 1

## 2024-06-12 ASSESSMENT — COLUMBIA-SUICIDE SEVERITY RATING SCALE - C-SSRS
6. HAVE YOU EVER DONE ANYTHING, STARTED TO DO ANYTHING, OR PREPARED TO DO ANYTHING TO END YOUR LIFE?: NO
2. HAVE YOU ACTUALLY HAD ANY THOUGHTS OF KILLING YOURSELF?: NO
1. IN THE PAST MONTH, HAVE YOU WISHED YOU WERE DEAD OR WISHED YOU COULD GO TO SLEEP AND NOT WAKE UP?: NO

## 2024-06-12 ASSESSMENT — PAIN - FUNCTIONAL ASSESSMENT: PAIN_FUNCTIONAL_ASSESSMENT: 0-10

## 2024-06-12 NOTE — H&P
History Of Present Illness  Lizbet Beltrán is a 74 y.o. female presenting with right shoulder pain here today for right intra-articular shoulder injection under fluoroscopy.     Past Medical History  Past Medical History:   Diagnosis Date    Acute upper respiratory infection, unspecified 08/31/2017    Acute upper respiratory infection    Cellulitis of unspecified part of limb     Cellulitis of lower extremity, unspecified laterality    Long term (current) use of opiate analgesic     Encounter for long-term opiate analgesic use    Personal history of other diseases of the musculoskeletal system and connective tissue     History of spinal stenosis    Personal history of other diseases of the respiratory system     History of sinus problem    Personal history of other endocrine, nutritional and metabolic disease     History of high cholesterol    Spondylosis without myelopathy or radiculopathy, lumbar region 01/14/2021    Facet degeneration of lumbar region    Unspecified osteoarthritis, unspecified site 06/23/2022    Arthritis       Surgical History  Past Surgical History:   Procedure Laterality Date    CATARACT EXTRACTION  03/16/2015    Cataract Surgery    KNEE SURGERY  11/12/2014    Knee Surgery    LUMBAR LAMINECTOMY  01/31/2018    Laminectomy Lumbar    OTHER SURGICAL HISTORY  11/18/2015    Nerve Block Spinal Accessory    TOTAL KNEE ARTHROPLASTY  07/27/2018    Knee Replacement        Social History  She reports that she has quit smoking. Her smoking use included cigarettes. She has never used smokeless tobacco. She reports that she does not drink alcohol and does not use drugs.    Family History  Family History   Problem Relation Name Age of Onset    Other (Cardiac Disorder) Mother      Osteoarthritis Mother      Colon cancer Father      Atrial fibrillation Sister      Hypertension Other      Diabetes Other gm         Allergies  Nickel, Codeine, and Pyrilamine-phenylephrin-dm tan    Review of Systems  Chronic  "pain syndrome secondary to postlaminectomy syndrome; chronic right shoulder pain secondary osteoarthritis  Physical Exam  Gen. appearance:  Patient's alert and oriented ×3 ;cooperative mild to moderate distress  Heart: Regular rate and rhythm  Lungs: Clear to auscultation  Abdomen: Soft nontender  Neuro: Cranial nerves II -XII intact; DTR: +2 over 4 biceps triceps brachial radialis patellar and Achilles  Spinal exam: Positive paraspinal tenderness noted bilaterally L4 5 L5-S1 with flexion extension and rotation/no bony abnormalities  Musculoskeletal:  Upper and lower extremity strength was 4/5 bilaterally  :  deferred  Skin: Warm and dry      Last Recorded Vitals  Blood pressure 145/74, pulse 77, temperature 36.4 °C (97.5 °F), resp. rate 17, height 1.727 m (5' 8\"), weight 107 kg (235 lb), SpO2 97%.    Relevant Results         Assessment/Plan   Active Problems:  There are no active Hospital Problems.      No changes in her medical history since her last visit on 5/16/2024       I spent 10 minutes in the professional and overall care of this patient.      Brian Wang,     "

## 2024-06-27 DIAGNOSIS — Z98.1 STATUS POST LUMBAR SPINAL FUSION: Primary | ICD-10-CM

## 2024-06-27 DIAGNOSIS — M47.819 SPINAL ARTHRITIS: ICD-10-CM

## 2024-06-27 RX ORDER — DICLOFENAC SODIUM AND MISOPROSTOL 75; 200 MG/1; UG/1
TABLET, DELAYED RELEASE ORAL
Qty: 180 TABLET | Refills: 3 | Status: SHIPPED | OUTPATIENT
Start: 2024-06-27

## 2024-07-01 DIAGNOSIS — Z98.1 STATUS POST LUMBAR SPINAL FUSION: ICD-10-CM

## 2024-07-01 DIAGNOSIS — M47.819 SPINAL ARTHRITIS: ICD-10-CM

## 2024-07-01 RX ORDER — DICLOFENAC SODIUM AND MISOPROSTOL 75; 200 MG/1; UG/1
TABLET, DELAYED RELEASE ORAL
Qty: 180 TABLET | Refills: 3 | Status: SHIPPED | OUTPATIENT
Start: 2024-07-01

## 2024-07-09 DIAGNOSIS — Z98.890 STATUS POST LUMBAR LAMINECTOMY: ICD-10-CM

## 2024-07-10 DIAGNOSIS — Z98.890 STATUS POST LUMBAR LAMINECTOMY: ICD-10-CM

## 2024-07-11 ENCOUNTER — APPOINTMENT (OUTPATIENT)
Dept: PAIN MEDICINE | Facility: CLINIC | Age: 74
End: 2024-07-11
Payer: MEDICARE

## 2024-07-15 ENCOUNTER — APPOINTMENT (OUTPATIENT)
Dept: PRIMARY CARE | Facility: CLINIC | Age: 74
End: 2024-07-15
Payer: MEDICARE

## 2024-07-15 VITALS
SYSTOLIC BLOOD PRESSURE: 140 MMHG | HEART RATE: 75 BPM | DIASTOLIC BLOOD PRESSURE: 75 MMHG | RESPIRATION RATE: 18 BRPM | TEMPERATURE: 97.9 F | BODY MASS INDEX: 36.07 KG/M2 | OXYGEN SATURATION: 96 % | WEIGHT: 238 LBS | HEIGHT: 68 IN

## 2024-07-15 DIAGNOSIS — M25.561 ARTHRALGIA OF KNEE, RIGHT: ICD-10-CM

## 2024-07-15 DIAGNOSIS — K29.30 CHRONIC SUPERFICIAL GASTRITIS WITHOUT BLEEDING: ICD-10-CM

## 2024-07-15 DIAGNOSIS — E55.9 VITAMIN D DEFICIENCY: ICD-10-CM

## 2024-07-15 DIAGNOSIS — E78.2 MIXED HYPERLIPIDEMIA: ICD-10-CM

## 2024-07-15 DIAGNOSIS — I10 PRIMARY HYPERTENSION: Primary | ICD-10-CM

## 2024-07-15 DIAGNOSIS — R53.82 CHRONIC FATIGUE: ICD-10-CM

## 2024-07-15 DIAGNOSIS — R07.89 ATYPICAL CHEST PAIN: ICD-10-CM

## 2024-07-15 DIAGNOSIS — Z12.31 SCREENING MAMMOGRAM, ENCOUNTER FOR: ICD-10-CM

## 2024-07-15 LAB
25(OH)D3 SERPL-MCNC: 51 NG/ML (ref 30–100)
ALBUMIN SERPL BCP-MCNC: 4.1 G/DL (ref 3.4–5)
ALP SERPL-CCNC: 72 U/L (ref 33–136)
ALT SERPL W P-5'-P-CCNC: 41 U/L (ref 7–45)
ANION GAP SERPL CALC-SCNC: 15 MMOL/L (ref 10–20)
AST SERPL W P-5'-P-CCNC: 27 U/L (ref 9–39)
BILIRUB SERPL-MCNC: 0.3 MG/DL (ref 0–1.2)
BUN SERPL-MCNC: 23 MG/DL (ref 6–23)
CALCIUM SERPL-MCNC: 9.1 MG/DL (ref 8.6–10.6)
CHLORIDE SERPL-SCNC: 102 MMOL/L (ref 98–107)
CHOLEST SERPL-MCNC: 159 MG/DL (ref 0–199)
CHOLESTEROL/HDL RATIO: 2.3
CO2 SERPL-SCNC: 28 MMOL/L (ref 21–32)
CREAT SERPL-MCNC: 0.67 MG/DL (ref 0.5–1.05)
EGFRCR SERPLBLD CKD-EPI 2021: >90 ML/MIN/1.73M*2
ERYTHROCYTE [DISTWIDTH] IN BLOOD BY AUTOMATED COUNT: 14.2 % (ref 11.5–14.5)
GLUCOSE SERPL-MCNC: 91 MG/DL (ref 74–99)
HCT VFR BLD AUTO: 37.1 % (ref 36–46)
HDLC SERPL-MCNC: 68.7 MG/DL
HGB BLD-MCNC: 11 G/DL (ref 12–16)
LDLC SERPL CALC-MCNC: 72 MG/DL
MCH RBC QN AUTO: 27.3 PG (ref 26–34)
MCHC RBC AUTO-ENTMCNC: 29.6 G/DL (ref 32–36)
MCV RBC AUTO: 92 FL (ref 80–100)
NON HDL CHOLESTEROL: 90 MG/DL (ref 0–149)
NRBC BLD-RTO: 0 /100 WBCS (ref 0–0)
PLATELET # BLD AUTO: 353 X10*3/UL (ref 150–450)
POTASSIUM SERPL-SCNC: 4.6 MMOL/L (ref 3.5–5.3)
PROT SERPL-MCNC: 6.9 G/DL (ref 6.4–8.2)
RBC # BLD AUTO: 4.03 X10*6/UL (ref 4–5.2)
SODIUM SERPL-SCNC: 140 MMOL/L (ref 136–145)
TRIGL SERPL-MCNC: 92 MG/DL (ref 0–149)
TSH SERPL-ACNC: 1.58 MIU/L (ref 0.44–3.98)
VLDL: 18 MG/DL (ref 0–40)
WBC # BLD AUTO: 6.8 X10*3/UL (ref 4.4–11.3)

## 2024-07-15 PROCEDURE — 1159F MED LIST DOCD IN RCRD: CPT | Performed by: FAMILY MEDICINE

## 2024-07-15 PROCEDURE — 82306 VITAMIN D 25 HYDROXY: CPT

## 2024-07-15 PROCEDURE — 1160F RVW MEDS BY RX/DR IN RCRD: CPT | Performed by: FAMILY MEDICINE

## 2024-07-15 PROCEDURE — 3077F SYST BP >= 140 MM HG: CPT | Performed by: FAMILY MEDICINE

## 2024-07-15 PROCEDURE — 1036F TOBACCO NON-USER: CPT | Performed by: FAMILY MEDICINE

## 2024-07-15 PROCEDURE — 85027 COMPLETE CBC AUTOMATED: CPT

## 2024-07-15 PROCEDURE — 36415 COLL VENOUS BLD VENIPUNCTURE: CPT

## 2024-07-15 PROCEDURE — 99214 OFFICE O/P EST MOD 30 MIN: CPT | Performed by: FAMILY MEDICINE

## 2024-07-15 PROCEDURE — 80061 LIPID PANEL: CPT

## 2024-07-15 PROCEDURE — 84443 ASSAY THYROID STIM HORMONE: CPT

## 2024-07-15 PROCEDURE — 1126F AMNT PAIN NOTED NONE PRSNT: CPT | Performed by: FAMILY MEDICINE

## 2024-07-15 PROCEDURE — 3078F DIAST BP <80 MM HG: CPT | Performed by: FAMILY MEDICINE

## 2024-07-15 PROCEDURE — 80053 COMPREHEN METABOLIC PANEL: CPT

## 2024-07-15 RX ORDER — GABAPENTIN 300 MG/1
300 CAPSULE ORAL EVERY 8 HOURS
Qty: 270 CAPSULE | Refills: 1 | Status: SHIPPED | OUTPATIENT
Start: 2024-07-15 | End: 2024-10-13

## 2024-07-15 RX ORDER — PANTOPRAZOLE SODIUM 40 MG/1
40 TABLET, DELAYED RELEASE ORAL
Qty: 90 TABLET | Refills: 3 | Status: SHIPPED | OUTPATIENT
Start: 2024-07-15

## 2024-07-15 ASSESSMENT — PATIENT HEALTH QUESTIONNAIRE - PHQ9
SUM OF ALL RESPONSES TO PHQ9 QUESTIONS 1 AND 2: 0
1. LITTLE INTEREST OR PLEASURE IN DOING THINGS: NOT AT ALL
2. FEELING DOWN, DEPRESSED OR HOPELESS: NOT AT ALL

## 2024-07-15 ASSESSMENT — PAIN SCALES - GENERAL: PAINLEVEL: 0-NO PAIN

## 2024-07-15 NOTE — PROGRESS NOTES
Subjective   Lizbet Beltrán is a 74 y.o. female who presents for Follow-up (Follow up visit blood pressure check and blood work completed today).    HPI  : Patient is a 74-year-old female who has multiple arthritic problems and has had several recent surgeries who is in today for follow-up, and recheck on blood work.  She has to ambulate with a wheeled walker to the previous left total knee replacement surgery and arthritis of the right knee which also needs surgery.  He also had lumbar spine surgery and cervical spine surgery recent past.  He also saw neurosurgery for follow-up and is now using a bone stimulator on her neck.  She does have overactive bladder problems and is receiving Botox injections from urology.  We will review her medications today and she has several questions about her recent follow-up visit with pain management.  She has had several spinal nerve blocks and also shoulder cortisone injection which did seem to help her arthritic pains.  She still has issues with the right knee and may need gel shots repeated.  She really does not want any other major surgeries at this time since her recovery was quite long for her other recent surgeries.    Objective  : ROS :10 systems were reviewed and the information is included in the HPI and no additional review of systems is indicated.    Physical Exam  Vitals and nursing note reviewed.   Constitutional:       Appearance: Normal appearance. She is obese.      Comments: Patient is alert and oriented x3.   No acute distress   HENT:      Head: Normocephalic.      Right Ear: Tympanic membrane and external ear normal.      Left Ear: Tympanic membrane and external ear normal.      Ears:      Comments: Ears are patent bilaterally and TMs are clear.     Nose: Nose normal.      Mouth/Throat:      Mouth: Mucous membranes are moist.      Pharynx: Oropharynx is clear.      Comments: Mouth is moist, tongue is midline.  No posterior pharyngeal erythema.  Eyes:       Extraocular Movements: Extraocular movements intact.      Conjunctiva/sclera: Conjunctivae normal.      Pupils: Pupils are equal, round, and reactive to light.      Comments: No visual disturbance, does have her eyes examined once a year.   Neck:      Comments: Using bone stimulator  for neck problems. Previous neck surgery and the neck fusion never   completed from the surgery.  Restricted range of motion testing cervical spine.  Cardiovascular:      Rate and Rhythm: Normal rate and regular rhythm.      Pulses: Normal pulses.      Heart sounds: Normal heart sounds.      Comments: Patient denies chest pain and no palpitations.  Heart rhythm is stable S1 and S2 are noted, no ectopics.  Pulmonary:      Effort: Pulmonary effort is normal.      Breath sounds: Normal breath sounds.      Comments: Does follow with pulmonary medicine for asthmatic bronchitis.  She does use a home nebulizer machine which helps her breathing.  Currently denies any coughing or wheezing.  Lungs are clear to auscultation.    Abdominal:      General: Bowel sounds are normal.      Palpations: Abdomen is soft.      Comments: Abdomen is soft and obese and difficult to evaluate due to obesity.  She does need a refill on her Protonix for gastroesophageal reflux and esophagitis.  No flank tenderness.  No suprapubic pain.  Positive bowel sounds.     Genitourinary:     Comments: Patient is receiving Botox injections for overactive  bladder.  She follows with urology.  Musculoskeletal:         General: Tenderness present. Normal range of motion.      Cervical back: Normal range of motion. Tenderness present.      Comments: Patient seeing pain management .  Previous  left  total knee replacement.  Age-related arthritis in the joints.  Restriction of motion cervical and lumbar spines due to  arthritis  and  muscle spasm.  Needs right total knee replacement in the future.  Previous  lumbar  spinal surgery and previous cervical spine surgery.  She does  follow with neurosurgery also.  Orthopedics is Dr. Segura at the clinic.   Skin:     General: Skin is warm.      Comments: There is no bruising, no erythema, no skin lesions noted, no rashes.   Neurological:      General: No focal deficit present.      Mental Status: She is alert and oriented to person, place, and time. Mental status is at baseline.      Sensory: Sensory deficit present.      Comments: Peripheral neuropathy.  Paresthesias  upper  and  lower extremities.   Unsteady gait and poor coordination.   Using  wheeled walker.    Psychiatric:         Mood and Affect: Mood normal.         Thought Content: Thought content normal.         Judgment: Judgment normal.      Comments: Patient has normal mood and affect. Anxiety with health issues.  Thought content and judgment are stable.  No signs of vascular dementia.      PLAN : Patient is a 74-year-old female who was reevaluated today for recheck on blood work, review of medication and also discussion concerning her arthritic right knee.  She really does not want any surgery now since she had her neck and back last year and also left total knee revision 2 years ago.  She has recovered from all surgeries and does not really want to have another right knee surgery so she will probably resort to the gel shots next.  She will be notified of her blood results in 4 days and further recommendations will be made at that time.  She will follow-up in 4 to 6 months pending blood work results.    Problem List Items Addressed This Visit       Atypical chest pain    Relevant Orders    CBC    Comprehensive Metabolic Panel    Lipid Panel    Thyroid Stimulating Hormone    Fatigue    Relevant Orders    CBC    Comprehensive Metabolic Panel    Lipid Panel    Thyroid Stimulating Hormone    Hyperlipidemia    Relevant Orders    CBC    Comprehensive Metabolic Panel    Lipid Panel    Thyroid Stimulating Hormone    Hypertension    Relevant Orders    CBC    Comprehensive Metabolic Panel     Lipid Panel    Thyroid Stimulating Hormone     Other Visit Diagnoses       Vitamin D deficiency        Relevant Orders    Vitamin D 25-Hydroxy,Total (for eval of Vitamin D levels)    Screening mammogram, encounter for        Relevant Orders    BI mammo bilateral screening tomosynthesis                 Vito Ordonez DO

## 2024-07-18 NOTE — RESULT ENCOUNTER NOTE
Patient is still little bit anemic at 11.0      she should take a vitamin with iron every day.  White blood cell count is normal   platelet count is normal     thyroid function is normal      vitamin D is normal at 51     cholesterol is normal at 159   triglycerides are normal at 92       blood sugar, kidney and liver function are all normal     blood work looks stable      just take the vitamin with iron daily

## 2024-07-22 RX ORDER — GABAPENTIN 300 MG/1
300 CAPSULE ORAL EVERY 8 HOURS
Qty: 270 CAPSULE | Refills: 0 | Status: SHIPPED | OUTPATIENT
Start: 2024-07-22 | End: 2024-10-20

## 2024-07-31 ENCOUNTER — HOSPITAL ENCOUNTER (OUTPATIENT)
Dept: RADIOLOGY | Facility: CLINIC | Age: 74
Discharge: HOME | End: 2024-07-31
Payer: MEDICARE

## 2024-07-31 VITALS — BODY MASS INDEX: 36.09 KG/M2 | HEIGHT: 68 IN | WEIGHT: 238.1 LBS

## 2024-07-31 DIAGNOSIS — Z12.31 SCREENING MAMMOGRAM, ENCOUNTER FOR: ICD-10-CM

## 2024-07-31 PROCEDURE — 77067 SCR MAMMO BI INCL CAD: CPT | Performed by: RADIOLOGY

## 2024-07-31 PROCEDURE — 77067 SCR MAMMO BI INCL CAD: CPT

## 2024-07-31 PROCEDURE — 77063 BREAST TOMOSYNTHESIS BI: CPT | Performed by: RADIOLOGY

## 2024-08-07 ENCOUNTER — APPOINTMENT (OUTPATIENT)
Dept: PAIN MEDICINE | Facility: CLINIC | Age: 74
End: 2024-08-07
Payer: MEDICARE

## 2024-08-08 ENCOUNTER — TELEMEDICINE (OUTPATIENT)
Dept: PAIN MEDICINE | Facility: CLINIC | Age: 74
End: 2024-08-08
Payer: MEDICARE

## 2024-08-08 DIAGNOSIS — M54.12 CERVICAL NEURITIS: ICD-10-CM

## 2024-08-08 DIAGNOSIS — M96.1 POSTLAMINECTOMY SYNDROME, LUMBAR: ICD-10-CM

## 2024-08-08 DIAGNOSIS — Z98.890 STATUS POST LUMBAR LAMINECTOMY: ICD-10-CM

## 2024-08-08 DIAGNOSIS — M48.062 LUMBAR STENOSIS WITH NEUROGENIC CLAUDICATION: ICD-10-CM

## 2024-08-08 DIAGNOSIS — M43.10 DEGENERATIVE SPONDYLOLISTHESIS: ICD-10-CM

## 2024-08-08 DIAGNOSIS — M16.9 OSTEOARTHROSIS, HIP: ICD-10-CM

## 2024-08-08 DIAGNOSIS — M54.16 LUMBAR RADICULOPATHY: ICD-10-CM

## 2024-08-08 DIAGNOSIS — M48.07 LUMBOSACRAL STENOSIS: ICD-10-CM

## 2024-08-08 DIAGNOSIS — M47.816 FACET DEGENERATION OF LUMBAR REGION: Primary | ICD-10-CM

## 2024-08-08 DIAGNOSIS — G62.9 NEUROPATHY: ICD-10-CM

## 2024-08-08 PROCEDURE — 1160F RVW MEDS BY RX/DR IN RCRD: CPT | Performed by: ANESTHESIOLOGY

## 2024-08-08 PROCEDURE — 1159F MED LIST DOCD IN RCRD: CPT | Performed by: ANESTHESIOLOGY

## 2024-08-08 PROCEDURE — 99213 OFFICE O/P EST LOW 20 MIN: CPT | Performed by: ANESTHESIOLOGY

## 2024-08-08 PROCEDURE — 1036F TOBACCO NON-USER: CPT | Performed by: ANESTHESIOLOGY

## 2024-08-08 RX ORDER — TRAMADOL HYDROCHLORIDE 50 MG/1
50 TABLET ORAL EVERY 8 HOURS PRN
Qty: 90 TABLET | Refills: 1 | Status: SHIPPED | OUTPATIENT
Start: 2024-08-08

## 2024-08-08 RX ORDER — NALOXONE HYDROCHLORIDE 4 MG/.1ML
1 SPRAY NASAL AS NEEDED
Qty: 2 EACH | Refills: 0 | Status: SHIPPED | OUTPATIENT
Start: 2024-08-08

## 2024-08-08 RX ORDER — GABAPENTIN 300 MG/1
600 CAPSULE ORAL EVERY 8 HOURS
Qty: 270 CAPSULE | Refills: 1 | Status: SHIPPED | OUTPATIENT
Start: 2024-08-08 | End: 2024-11-06

## 2024-08-08 ASSESSMENT — ENCOUNTER SYMPTOMS
DEPRESSION: 0
OCCASIONAL FEELINGS OF UNSTEADINESS: 1
LOSS OF SENSATION IN FEET: 1

## 2024-08-08 NOTE — PROGRESS NOTES
Pain #7/10 to lower back and right knee and also neck pain.  Pain increases with activity. Taking 3 Tramadol per day and has 20 tablets left.  This reduces her pain by at least 75%. Supplements with tylenol as well as Gabapentin 600mg TID.

## 2024-08-08 NOTE — PROGRESS NOTES
Subjective   Patient ID: Lizbet Beltrán is a 74 y.o. female who had a virtual office visit today by telephone.  This was necessitated by the power outage at [facility].  She was identified as to her name date of birth and Social Security number.  She has had multiple cervical and lumbar laminectomies and has chronic pain which she rates to be 5 up to 7 on a 1-10 scale.  Currently she takes tramadol 50 mg tablets 3 times a day and has a pill count according to her of 20 tablets.  She has a complete refill of her gabapentin and has approximately 300 capsules.  She takes 600 mg 3 times a day which tends to be working well.  She is not drug-seeking.  She is alert and oriented x 3 and pleasant and cooperative on the telephone.  HPI  Longstanding history of previous lumbar laminectomies and fusions as well as cervical laminectomy and fusion with a history of chronic pain syndrome  Review of Systems  Chronic numbness and tingling as well as neuropathic pain syndromes post cervical and lumbar laminectomies.  Pain is well-controlled when taking her medication as directed  Objective   Physical Exam  Deferred secondary to telephone visit  Assessment/Plan   Diagnoses and all orders for this visit:  Facet degeneration of lumbar region  -     Follow Up In Pain Medicine; Future  Degenerative spondylolisthesis  Neuropathy  Postlaminectomy syndrome, lumbar  -     traMADol (Ultram) 50 mg tablet; Take 1 tablet (50 mg) by mouth every 8 hours if needed for severe pain (7 - 10).  -     naloxone (Narcan) 4 mg/0.1 mL nasal spray; Administer 1 spray (4 mg) into affected nostril(s) if needed for opioid reversal. May repeat every 2-3 minutes if needed, alternating nostrils, until medical assistance becomes available.  -     Follow Up In Pain Medicine; Future  Lumbar stenosis with neurogenic claudication  -     traMADol (Ultram) 50 mg tablet; Take 1 tablet (50 mg) by mouth every 8 hours if needed for severe pain (7 - 10).  -      naloxone (Narcan) 4 mg/0.1 mL nasal spray; Administer 1 spray (4 mg) into affected nostril(s) if needed for opioid reversal. May repeat every 2-3 minutes if needed, alternating nostrils, until medical assistance becomes available.  Status post lumbar laminectomy  -     gabapentin (Neurontin) 300 mg capsule; Take 2 capsules (600 mg) by mouth every 8 hours.  -     Follow Up In Pain Medicine; Future  Lumbosacral stenosis  -     traMADol (Ultram) 50 mg tablet; Take 1 tablet (50 mg) by mouth every 8 hours if needed for severe pain (7 - 10).  Lumbar radiculopathy  -     traMADol (Ultram) 50 mg tablet; Take 1 tablet (50 mg) by mouth every 8 hours if needed for severe pain (7 - 10).  -     naloxone (Narcan) 4 mg/0.1 mL nasal spray; Administer 1 spray (4 mg) into affected nostril(s) if needed for opioid reversal. May repeat every 2-3 minutes if needed, alternating nostrils, until medical assistance becomes available.  Cervical neuritis  -     traMADol (Ultram) 50 mg tablet; Take 1 tablet (50 mg) by mouth every 8 hours if needed for severe pain (7 - 10).  Osteoarthrosis, hip  -     traMADol (Ultram) 50 mg tablet; Take 1 tablet (50 mg) by mouth every 8 hours if needed for severe pain (7 - 10).  Patient has follow-up office visit in person on October 2, 2024.  All questions were answered prior to discontinuing this telephone visit.  She was urged to continue home exercise program and also to keep her medications as secure location and take them only as directed.       Brian Wang,  08/08/24 9:40 AM

## 2024-09-10 ENCOUNTER — APPOINTMENT (OUTPATIENT)
Dept: PRIMARY CARE | Facility: CLINIC | Age: 74
End: 2024-09-10
Payer: MEDICARE

## 2024-09-11 ENCOUNTER — CLINICAL SUPPORT (OUTPATIENT)
Dept: PRIMARY CARE | Facility: CLINIC | Age: 74
End: 2024-09-11
Payer: MEDICARE

## 2024-09-11 DIAGNOSIS — R30.0 DYSURIA: Primary | ICD-10-CM

## 2024-09-11 DIAGNOSIS — R39.9 UTI SYMPTOMS: ICD-10-CM

## 2024-09-11 LAB
APPEARANCE UR: ABNORMAL
BILIRUB UR QL STRIP: NEGATIVE
COLOR UR: YELLOW
GLUCOSE UR STRIP-MCNC: NEGATIVE MG/DL
HGB UR QL STRIP: ABNORMAL
KETONES UR STRIP-MCNC: NEGATIVE MG/DL
LEUKOCYTE ESTERASE UR QL STRIP: ABNORMAL
NITRITE UR QL STRIP: NEGATIVE
PH UR STRIP: 5.5 [PH]
PROT UR STRIP-MCNC: ABNORMAL MG/DL
SP GR UR STRIP.AUTO: 1.02
UROBILINOGEN UR STRIP-ACNC: 0.2 E.U./DL

## 2024-09-11 PROCEDURE — 87086 URINE CULTURE/COLONY COUNT: CPT

## 2024-09-11 PROCEDURE — 87186 SC STD MICRODIL/AGAR DIL: CPT

## 2024-09-11 RX ORDER — NITROFURANTOIN 25; 75 MG/1; MG/1
100 CAPSULE ORAL 2 TIMES DAILY
Qty: 14 CAPSULE | Refills: 0 | Status: SHIPPED | OUTPATIENT
Start: 2024-09-11 | End: 2024-09-18

## 2024-09-12 NOTE — PROGRESS NOTES
Pt came in for uti symptoms. Pt gave urine sample. Will run ua in office and send out for culture.

## 2024-09-12 NOTE — RESULT ENCOUNTER NOTE
Urine culture is positive and I have to wait for the final report but she can take the antibiotic that I sent to the pharmacy yesterday.  If she has pain or burning she can get some Azo over-the-counter at the pharmacy

## 2024-09-13 LAB — BACTERIA UR CULT: ABNORMAL

## 2024-09-17 NOTE — RESULT ENCOUNTER NOTE
Patient does have a urinary tract infection and she can complete the nitrofurantoin that I prescribed yesterday.

## 2024-10-02 ENCOUNTER — OFFICE VISIT (OUTPATIENT)
Dept: PAIN MEDICINE | Facility: CLINIC | Age: 74
End: 2024-10-02
Payer: MEDICARE

## 2024-10-02 VITALS
BODY MASS INDEX: 36.07 KG/M2 | RESPIRATION RATE: 18 BRPM | HEIGHT: 68 IN | DIASTOLIC BLOOD PRESSURE: 62 MMHG | TEMPERATURE: 97.5 F | HEART RATE: 73 BPM | SYSTOLIC BLOOD PRESSURE: 134 MMHG | WEIGHT: 238 LBS | OXYGEN SATURATION: 94 %

## 2024-10-02 DIAGNOSIS — Z98.890 STATUS POST LUMBAR LAMINECTOMY: ICD-10-CM

## 2024-10-02 DIAGNOSIS — Z79.891 OPIATE ANALGESIC CONTRACT EXISTS: Primary | ICD-10-CM

## 2024-10-02 DIAGNOSIS — M48.07 LUMBOSACRAL STENOSIS: ICD-10-CM

## 2024-10-02 DIAGNOSIS — M54.16 LUMBAR RADICULOPATHY: ICD-10-CM

## 2024-10-02 DIAGNOSIS — M96.1 POSTLAMINECTOMY SYNDROME, LUMBAR: ICD-10-CM

## 2024-10-02 DIAGNOSIS — M48.062 LUMBAR STENOSIS WITH NEUROGENIC CLAUDICATION: ICD-10-CM

## 2024-10-02 DIAGNOSIS — M54.12 CERVICAL NEURITIS: ICD-10-CM

## 2024-10-02 DIAGNOSIS — M16.9 OSTEOARTHROSIS, HIP: ICD-10-CM

## 2024-10-02 DIAGNOSIS — M47.816 FACET DEGENERATION OF LUMBAR REGION: ICD-10-CM

## 2024-10-02 LAB
AMPHETAMINES UR QL SCN: NORMAL
BARBITURATES UR QL SCN: NORMAL
BZE UR QL SCN: NORMAL
CANNABINOIDS UR QL SCN: NORMAL
CREAT UR-MCNC: 99.2 MG/DL (ref 20–320)
PCP UR QL SCN: NORMAL

## 2024-10-02 PROCEDURE — 99213 OFFICE O/P EST LOW 20 MIN: CPT | Performed by: ANESTHESIOLOGY

## 2024-10-02 PROCEDURE — 80307 DRUG TEST PRSMV CHEM ANLYZR: CPT | Performed by: ANESTHESIOLOGY

## 2024-10-02 PROCEDURE — 1159F MED LIST DOCD IN RCRD: CPT | Performed by: ANESTHESIOLOGY

## 2024-10-02 PROCEDURE — 3008F BODY MASS INDEX DOCD: CPT | Performed by: ANESTHESIOLOGY

## 2024-10-02 PROCEDURE — 3078F DIAST BP <80 MM HG: CPT | Performed by: ANESTHESIOLOGY

## 2024-10-02 PROCEDURE — 1125F AMNT PAIN NOTED PAIN PRSNT: CPT | Performed by: ANESTHESIOLOGY

## 2024-10-02 PROCEDURE — 3075F SYST BP GE 130 - 139MM HG: CPT | Performed by: ANESTHESIOLOGY

## 2024-10-02 RX ORDER — GABAPENTIN 300 MG/1
600 CAPSULE ORAL 3 TIMES DAILY
Qty: 540 CAPSULE | Refills: 0 | Status: SHIPPED | OUTPATIENT
Start: 2024-10-02 | End: 2024-12-31

## 2024-10-02 RX ORDER — TRAMADOL HYDROCHLORIDE 50 MG/1
50 TABLET ORAL EVERY 8 HOURS PRN
Qty: 90 TABLET | Refills: 1 | Status: SHIPPED | OUTPATIENT
Start: 2024-10-02

## 2024-10-02 ASSESSMENT — ENCOUNTER SYMPTOMS
DEPRESSION: 0
LOSS OF SENSATION IN FEET: 0
OCCASIONAL FEELINGS OF UNSTEADINESS: 1

## 2024-10-02 ASSESSMENT — COLUMBIA-SUICIDE SEVERITY RATING SCALE - C-SSRS
1. IN THE PAST MONTH, HAVE YOU WISHED YOU WERE DEAD OR WISHED YOU COULD GO TO SLEEP AND NOT WAKE UP?: NO
2. HAVE YOU ACTUALLY HAD ANY THOUGHTS OF KILLING YOURSELF?: NO
6. HAVE YOU EVER DONE ANYTHING, STARTED TO DO ANYTHING, OR PREPARED TO DO ANYTHING TO END YOUR LIFE?: NO

## 2024-10-02 ASSESSMENT — PAIN SCALES - GENERAL: PAINLEVEL: 7

## 2024-10-02 NOTE — PROGRESS NOTES
Subjective   Patient ID: Lizbet Beltrán is a 74 y.o. female is Here for medication management.  Patient has had a multiple cervical laminectomies and fusions as well as lumbar laminectomy and fusion x 3.  She suffers from chronic pain syndrome and takes tramadol 50 mg tablets 3 times a day and gabapentin 1800 mg daily.  She needs refills of both medications.    HPI  Cervical laminectomy x 2;  Review of Systems  Unremarkable except chief complaint.  Patient's states she is doing fairly well when taking her medication regularly.  She is using a walker today to ambulate  Objective   Physical Exam  Gen. appearance:  Patient's alert and oriented ×3 ;cooperative mild to moderate distress  Heart: Regular rate and rhythm  Lungs: Clear to auscultation  Abdomen: Soft nontender  Neuro: Cranial nerves II -XII intact; DTR: +2 over 4 biceps triceps brachial radialis patellar and Achilles  Spinal exam: Positive paraspinal tenderness noted bilaterally L4 5 L5-S1 with flexion extension and rotation/no bony abnormalities  Musculoskeletal:  Upper and lower extremity strength was 4/5 bilaterally  :  deferred  Skin: Warm and dry      Assessment/Plan   Diagnoses and all orders for this visit:  Opiate analgesic contract exists  -     Opiate/Opioid/Benzo Prescription Compliance  -     OOB Internal Tracking  Facet degeneration of lumbar region  -     Follow Up In Pain Medicine  Postlaminectomy syndrome, lumbar  -     Follow Up In Pain Medicine  -     traMADol (Ultram) 50 mg tablet; Take 1 tablet (50 mg) by mouth every 8 hours if needed for severe pain (7 - 10).  -     gabapentin (Neurontin) 300 mg capsule; Take 2 capsules (600 mg) by mouth 3 times a day.  -     Follow Up In Pain Medicine; Future  Status post lumbar laminectomy  -     Follow Up In Pain Medicine  -     gabapentin (Neurontin) 300 mg capsule; Take 2 capsules (600 mg) by mouth 3 times a day.  -     Follow Up In Pain Medicine; Future  Lumbar stenosis with neurogenic  claudication  -     traMADol (Ultram) 50 mg tablet; Take 1 tablet (50 mg) by mouth every 8 hours if needed for severe pain (7 - 10).  -     gabapentin (Neurontin) 300 mg capsule; Take 2 capsules (600 mg) by mouth 3 times a day.  -     Follow Up In Pain Medicine; Future  Lumbosacral stenosis  -     traMADol (Ultram) 50 mg tablet; Take 1 tablet (50 mg) by mouth every 8 hours if needed for severe pain (7 - 10).  Lumbar radiculopathy  -     traMADol (Ultram) 50 mg tablet; Take 1 tablet (50 mg) by mouth every 8 hours if needed for severe pain (7 - 10).  -     gabapentin (Neurontin) 300 mg capsule; Take 2 capsules (600 mg) by mouth 3 times a day.  -     Follow Up In Pain Medicine; Future  Cervical neuritis  -     traMADol (Ultram) 50 mg tablet; Take 1 tablet (50 mg) by mouth every 8 hours if needed for severe pain (7 - 10).  -     gabapentin (Neurontin) 300 mg capsule; Take 2 capsules (600 mg) by mouth 3 times a day.  -     Follow Up In Pain Medicine; Future  Osteoarthrosis, hip  -     traMADol (Ultram) 50 mg tablet; Take 1 tablet (50 mg) by mouth every 8 hours if needed for severe pain (7 - 10).    Patient has a follow-up visit in 10 weeks on December 11, 2024.  She was told to take all medication as directed and keep a secure location at all times.

## 2024-10-02 NOTE — PROGRESS NOTES
Pain #7/10 to her neck, lower back and right knee.   Pain increases with activity and is very stiff in the morning.   Taking 3 Tramadol per day and has 40 tablets left.  Takes Gabapentin 2 tabs TID.  These reduce her pain by at least 50%.

## 2024-10-07 LAB
1OH-MIDAZOLAM UR CFM-MCNC: <25 NG/ML
6MAM UR CFM-MCNC: <25 NG/ML
7AMINOCLONAZEPAM UR CFM-MCNC: <25 NG/ML
A-OH ALPRAZ UR CFM-MCNC: <25 NG/ML
ALPRAZ UR CFM-MCNC: <25 NG/ML
CHLORDIAZEP UR CFM-MCNC: <25 NG/ML
CLONAZEPAM UR CFM-MCNC: <25 NG/ML
CODEINE UR CFM-MCNC: <50 NG/ML
DIAZEPAM UR CFM-MCNC: <25 NG/ML
EDDP UR CFM-MCNC: <25 NG/ML
FENTANYL UR CFM-MCNC: <2.5 NG/ML
HYDROCODONE CTO UR CFM-MCNC: <25 NG/ML
HYDROMORPHONE UR CFM-MCNC: <25 NG/ML
LORAZEPAM UR CFM-MCNC: <25 NG/ML
METHADONE UR CFM-MCNC: <25 NG/ML
MIDAZOLAM UR CFM-MCNC: <25 NG/ML
MORPHINE UR CFM-MCNC: <50 NG/ML
NORDIAZEPAM UR CFM-MCNC: <25 NG/ML
NORFENTANYL UR CFM-MCNC: <2.5 NG/ML
NORHYDROCODONE UR CFM-MCNC: <25 NG/ML
NOROXYCODONE UR CFM-MCNC: <25 NG/ML
NORTRAMADOL UR-MCNC: >1000 NG/ML
OXAZEPAM UR CFM-MCNC: <25 NG/ML
OXYCODONE UR CFM-MCNC: <25 NG/ML
OXYMORPHONE UR CFM-MCNC: <25 NG/ML
TEMAZEPAM UR CFM-MCNC: <25 NG/ML
TRAMADOL UR CFM-MCNC: >1000 NG/ML
ZOLPIDEM UR CFM-MCNC: <25 NG/ML
ZOLPIDEM UR-MCNC: <25 NG/ML

## 2024-10-31 DIAGNOSIS — Z87.891 HISTORY OF NICOTINE DEPENDENCE: Primary | ICD-10-CM

## 2024-11-13 DIAGNOSIS — U07.1 COVID-19: Primary | ICD-10-CM

## 2024-11-14 ENCOUNTER — TELEPHONE (OUTPATIENT)
Dept: PRIMARY CARE | Facility: CLINIC | Age: 74
End: 2024-11-14
Payer: MEDICARE

## 2024-11-14 RX ORDER — AZITHROMYCIN 250 MG/1
TABLET, FILM COATED ORAL
Qty: 6 TABLET | Refills: 0 | Status: SHIPPED | OUTPATIENT
Start: 2024-11-14 | End: 2024-11-19

## 2024-11-14 RX ORDER — METHYLPREDNISOLONE 4 MG/1
TABLET ORAL
Qty: 21 TABLET | Refills: 0 | Status: SHIPPED | OUTPATIENT
Start: 2024-11-14 | End: 2024-11-21

## 2024-11-14 NOTE — TELEPHONE ENCOUNTER
Patient tested positive for covid, would like for you to call in a Paxlovid if you would please.  [facility] [phone]

## 2024-11-20 ENCOUNTER — APPOINTMENT (OUTPATIENT)
Dept: PRIMARY CARE | Facility: CLINIC | Age: 74
End: 2024-11-20
Payer: MEDICARE

## 2024-11-20 VITALS
OXYGEN SATURATION: 96 % | WEIGHT: 238 LBS | SYSTOLIC BLOOD PRESSURE: 134 MMHG | HEART RATE: 84 BPM | RESPIRATION RATE: 20 BRPM | DIASTOLIC BLOOD PRESSURE: 78 MMHG | HEIGHT: 68 IN | TEMPERATURE: 97.9 F | BODY MASS INDEX: 36.07 KG/M2

## 2024-11-20 DIAGNOSIS — M54.12 CERVICAL NEURITIS: ICD-10-CM

## 2024-11-20 DIAGNOSIS — E78.5 DYSLIPIDEMIA: ICD-10-CM

## 2024-11-20 DIAGNOSIS — Z98.1 STATUS POST LUMBAR SPINAL FUSION: ICD-10-CM

## 2024-11-20 DIAGNOSIS — Z79.899 HIGH RISK MEDICATION USE: ICD-10-CM

## 2024-11-20 DIAGNOSIS — R79.89 LOW VITAMIN D LEVEL: ICD-10-CM

## 2024-11-20 DIAGNOSIS — R09.89 LABILE HYPERTENSION: Primary | ICD-10-CM

## 2024-11-20 DIAGNOSIS — R53.83 OTHER FATIGUE: ICD-10-CM

## 2024-11-20 DIAGNOSIS — U07.1 COVID-19: ICD-10-CM

## 2024-11-20 DIAGNOSIS — N32.9 BLADDER PROBLEM: ICD-10-CM

## 2024-11-20 DIAGNOSIS — M48.07 SPINAL STENOSIS OF LUMBOSACRAL REGION: ICD-10-CM

## 2024-11-20 DIAGNOSIS — J45.991 COUGH VARIANT ASTHMA (HHS-HCC): ICD-10-CM

## 2024-11-20 DIAGNOSIS — D50.9 IRON DEFICIENCY ANEMIA, UNSPECIFIED IRON DEFICIENCY ANEMIA TYPE: ICD-10-CM

## 2024-11-20 PROCEDURE — 1126F AMNT PAIN NOTED NONE PRSNT: CPT | Performed by: FAMILY MEDICINE

## 2024-11-20 PROCEDURE — 3075F SYST BP GE 130 - 139MM HG: CPT | Performed by: FAMILY MEDICINE

## 2024-11-20 PROCEDURE — 3008F BODY MASS INDEX DOCD: CPT | Performed by: FAMILY MEDICINE

## 2024-11-20 PROCEDURE — 1036F TOBACCO NON-USER: CPT | Performed by: FAMILY MEDICINE

## 2024-11-20 PROCEDURE — 1123F ACP DISCUSS/DSCN MKR DOCD: CPT | Performed by: FAMILY MEDICINE

## 2024-11-20 PROCEDURE — 99214 OFFICE O/P EST MOD 30 MIN: CPT | Performed by: FAMILY MEDICINE

## 2024-11-20 PROCEDURE — 1159F MED LIST DOCD IN RCRD: CPT | Performed by: FAMILY MEDICINE

## 2024-11-20 PROCEDURE — 3078F DIAST BP <80 MM HG: CPT | Performed by: FAMILY MEDICINE

## 2024-11-20 PROCEDURE — 1160F RVW MEDS BY RX/DR IN RCRD: CPT | Performed by: FAMILY MEDICINE

## 2024-11-20 ASSESSMENT — PAIN SCALES - GENERAL: PAINLEVEL_OUTOF10: 0-NO PAIN

## 2024-11-20 NOTE — PROGRESS NOTES
Subjective   Lizbet Beltrán is a 74 y.o. female who presents for Follow-up (Follow up visit).    HPI  :  Had Covid  2 weeks ago.  Sinus better.  Still  using nebulizer treatments for her history of asthmatic bronchitis.  She also is scheduled next week to have Botox injections for her overactive bladder problems and issues related to her previous lumbar back surgery.  She does have an implanted pain stimulator.  I did prescribe a Medrol Dosepak and a Z-Cody 2 weeks ago when she had the COVID and she seems to be improving.    Objective   : ROS : 10 systems were reviewed and the information is included in the HPI and no additional review of systems is indicated.    Physical Exam  Vitals and nursing note reviewed.   Constitutional:       Appearance: Normal appearance.      Comments: Patient is alert and oriented x3.   No acute distress   HENT:      Head: Normocephalic.      Right Ear: Tympanic membrane and external ear normal.      Left Ear: Tympanic membrane and external ear normal.      Ears:      Comments: Ears are patent bilaterally and TMs are clear.     Nose: Congestion and rhinorrhea present.      Comments: Sinus congestion and nasal rhinorrhea since she had COVID 2 weeks ago but is much better.  Did finish her Z-Cody and steroids.     Mouth/Throat:      Mouth: Mucous membranes are moist.      Pharynx: Oropharynx is clear.      Comments: Mouth is moist, tongue is midline.  No posterior pharyngeal erythema.  Eyes:      Extraocular Movements: Extraocular movements intact.      Conjunctiva/sclera: Conjunctivae normal.      Pupils: Pupils are equal, round, and reactive to light.      Comments: No visual disturbance, does have her eyes examined once a year.   Neck:      Comments: Cervical  neuritis  and follows with neuro surgeon Dr Gatito Alvarez.   Cardiovascular:      Rate and Rhythm: Normal rate and regular rhythm.      Pulses: Normal pulses.      Heart sounds: Normal heart sounds.      Comments: Patient denies  chest pain and no palpitations.  Heart rhythm is stable S1 and S2 are noted, no ectopics.  Pulmonary:      Effort: Pulmonary effort is normal.      Breath sounds: Rhonchi present.      Comments: History of asthmatic bronchitis.  Lungs with few rhonchi  ,but no wheezing.     Abdominal:      General: Bowel sounds are normal.      Palpations: Abdomen is soft.      Comments: Abdomen is soft and obese and non tender.   No flank tenderness.  No suprapubic pain.    No abdominal guarding and no rebound tenderness.   Genitourinary:     Comments: Having Botox injection for bladder  next week.   Overactive bladder problems and incontinence stemming from her lumbosacral spine surgery.  Musculoskeletal:         General: Normal range of motion.      Cervical back: Normal range of motion. Tenderness present.      Comments: Post total left knee  replacement.  Previous lumbosacral spine surgery and also cervical spine surgery.  Has a pain stimulator in her lumbar region.  Restricted range of motion cervical, thoracic, and lumbar spine due to degenerative arthritis and previous surgeries.   Skin:     General: Skin is warm.      Findings: Rash present.      Comments: Venous stasis lower extremities.   Mild ankle edema.    Neurological:      Mental Status: She is alert and oriented to person, place, and time. Mental status is at baseline.      Sensory: Sensory deficit present.      Motor: Weakness present.      Coordination: Coordination abnormal.      Gait: Gait abnormal.      Comments: Ambulates with a cane due to unsteady gait and poor coordination.  Has had previous cervical and lumbar spine surgeries and has peripheral neuropathy.  Weakness in the lower extremities and has had previous left total knee replacement x 2.  Is waiting before she has a right total knee done.    Psychiatric:         Behavior: Behavior normal.         Thought Content: Thought content normal.         Judgment: Judgment normal.      Comments: Patient has  normal mood and affect. Anxiety with Urinary problems.   Thought content and judgment are stable.  No signs of vascular dementia.  Behavior is normal.     PLAN : Patient is a 74-year-old female who was evaluated for follow-up exam and review of medications.  She also had COVID 2 weeks ago and is still recovering but much better and did take the Medrol Dosepak and a Z-Cody.  She still uses her nebulizer machine due to a history of asthmatic bronchitis.  She is having Botox injected in the bladder next week by urology due to bladder problems and incontinence.  She also follows with pain management and neurosurgery.  She will follow-up in this office in 4 to 6 months or sooner if needed.    Problem List Items Addressed This Visit    None           Vito Ordonez, DO

## 2024-12-02 ENCOUNTER — OFFICE VISIT (OUTPATIENT)
Facility: CLINIC | Age: 74
End: 2024-12-02
Payer: MEDICARE

## 2024-12-02 ENCOUNTER — HOSPITAL ENCOUNTER (OUTPATIENT)
Dept: RADIOLOGY | Facility: CLINIC | Age: 74
Discharge: HOME | End: 2024-12-02
Payer: MEDICARE

## 2024-12-02 VITALS
HEART RATE: 64 BPM | SYSTOLIC BLOOD PRESSURE: 128 MMHG | BODY MASS INDEX: 36.83 KG/M2 | WEIGHT: 243 LBS | TEMPERATURE: 98 F | DIASTOLIC BLOOD PRESSURE: 80 MMHG | HEIGHT: 68 IN

## 2024-12-02 DIAGNOSIS — M81.0 AGE-RELATED OSTEOPOROSIS WITHOUT CURRENT PATHOLOGICAL FRACTURE: ICD-10-CM

## 2024-12-02 DIAGNOSIS — M47.12 CERVICAL SPONDYLOSIS WITH MYELOPATHY: ICD-10-CM

## 2024-12-02 DIAGNOSIS — M47.12 CERVICAL SPONDYLOSIS WITH MYELOPATHY: Primary | ICD-10-CM

## 2024-12-02 PROCEDURE — 72050 X-RAY EXAM NECK SPINE 4/5VWS: CPT | Performed by: RADIOLOGY

## 2024-12-02 PROCEDURE — 72050 X-RAY EXAM NECK SPINE 4/5VWS: CPT

## 2024-12-02 ASSESSMENT — PAIN SCALES - GENERAL: PAINLEVEL_OUTOF10: 7

## 2024-12-02 NOTE — PROGRESS NOTES
[facility]  Department of Neurological Surgery  Established Patient Visit    History of Present Illness:  Lizbet Beltrán is a 74 y.o. 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     At 3/12/24 visit, patient presents with significant neck pain that has made it difficult for her to lift her head to look straight forward.  She also has significant low back pain with radiation into her left leg and down the back of the thigh and front of the thigh.  She also has right knee pain but she says that is because she needs a knee replacement.  She has had numerous back operations over the years.  She has an epidural stimulator in her spine due to her chronic back pain.  That was implanted prior to her last fusion operation.  Her recent CAT scan shows that the screws are all solidly placed in her lumbar spine but the bony connection between the vertebra is minimal.  Prior to her cervical operation she was complaining of balance difficulty and difficulty with numbness and tingling in her hands.  She was found to have severe cord compression.  An anterior cervical operation at 3 levels was performed.  She developed a fusion at C3-4 but she did not fused at C4-5 nor C5-6 and the cages and screw construct at each of those levels has settled into the vertebral bodies and almost destroyed them.    Today, she continues with significant neck pain and intermittent numbness and tingling in the arms. She has difficulty with horizontal gaze. She continues to use a bone stimulator. She wears a hard collar intermittently. She uses a walker secondary to difficulty ambulating.    Patient's BMI is Body mass index is 36.95 kg/m².    Diabetic: no     Osteoporosis: yes of the fact that all the implants seem to erode right into the vertebral bodies   No DXA results found for the past 12 months    Review of Systems:  14/14 systems reviewed and negative other  than what is listed in the history of present illness    Patient Active Problem List   Diagnosis    Abdominal pain    Back arthralgia    Chronic radicular pain of lower extremity    Low back pain    Abnormal chest xray    Acute bronchitis    Allergic bronchitis (HHS-HCC)    Allergic rhinitis    Asthmatic bronchitis (HHS-HCC)    Chronic bronchitis (Multi)    Chronic pansinusitis    Chronic rhinitis    Chronic sinusitis    Cough variant asthma (HHS-HCC)    Simple chronic bronchitis (Multi)    Acute serous otitis media of both ears    Acute upper respiratory infection    Alternating constipation and diarrhea    Chronic constipation    Chronic diarrhea    Irritable bowel syndrome with diarrhea    Arthralgia of knee, right    Arthritis    Arthropathy of multiple sites    Baker's cyst of knee    Deltoid tendinitis, right    Peripheral neuropathy    Plantar fasciitis    Polyarthritis    Shoulder capsulitis, left    Atypical chest pain    Bilateral cataracts    Bilateral leg cramps    Bilateral lower leg cellulitis    Bladder problem    Breast asymmetry in female    Cellulitis    Cervical neuritis    Cervical stenosis (uterine cervix)    Change in bowel habit    Contusion of right foot    Contusion of right hip    Cough    Throat clearing    Degenerative spondylolisthesis    Primary osteoarthritis of right shoulder    Primary osteoarthritis of left shoulder    Ankle edema    Bilateral leg edema    Epicondylitis, lateral, left    Epistaxis    Post-nasal drainage    External hemorrhoids    Facet degeneration of lumbar region    Fatigue    Gastroenteritis    Headache    Hematoma of right lower leg    High vitamin D level    Hyperlipidemia    Increased urinary frequency    Knee effusion, left    Loose stools    Cervical myelopathy    Cervical radiculopathy at C6    Cervical stenosis of spine    Costochondritis    Lumbar radiculopathy    Lumbar stenosis with neurogenic claudication    Lumbosacral stenosis    Osteoarthritis of  spine with radiculopathy, cervical region    Cervical strain    Spinal stenosis    Lung mass    Lung nodule, multiple    Metatarsalgia of left foot    Muscle weakness    Nasal congestion    Obesity    Neoplasm of back    Osteopenia    Overactive bladder    Palpitations    Postoperative stiffness of total knee replacement (CMS-HCC)    Postlaminectomy syndrome, lumbar    Status post lumbar spinal fusion    Thoracic myofascial strain    Thrombocytosis    Thyroiditis    Hypertension    Hypokalemia    History of total left knee replacement    Primary osteoarthritis of right knee    Neuropathy    Obesity, morbid (Multi)    Labile hypertension    Chronic superficial gastritis without bleeding    Screening mammogram, encounter for    Vitamin D deficiency    Iron deficiency anemia    COVID-19     Past Medical History:   Diagnosis Date    Acute upper respiratory infection, unspecified 08/31/2017    Acute upper respiratory infection    Cellulitis of unspecified part of limb     Cellulitis of lower extremity, unspecified laterality    Long term (current) use of opiate analgesic     Encounter for long-term opiate analgesic use    Personal history of other diseases of the musculoskeletal system and connective tissue     History of spinal stenosis    Personal history of other diseases of the respiratory system     History of sinus problem    Personal history of other endocrine, nutritional and metabolic disease     History of high cholesterol    Spondylosis without myelopathy or radiculopathy, lumbar region 01/14/2021    Facet degeneration of lumbar region    Unspecified osteoarthritis, unspecified site 06/23/2022    Arthritis     Past Surgical History:   Procedure Laterality Date    CATARACT EXTRACTION  03/16/2015    Cataract Surgery    KNEE SURGERY  11/12/2014    Knee Surgery    LUMBAR LAMINECTOMY  01/31/2018    Laminectomy Lumbar    OTHER SURGICAL HISTORY  11/18/2015    Nerve Block Spinal Accessory    TOTAL KNEE ARTHROPLASTY   07/27/2018    Knee Replacement     Social History     Tobacco Use    Smoking status: Former     Types: Cigarettes    Smokeless tobacco: Never   Substance Use Topics    Alcohol use: Never     family history includes Atrial fibrillation in her sister; Breast cancer (age of onset: 61) in her sister; Cardiac Disorder in her mother; Colon cancer in her father; Diabetes in an other family member; Hypertension in an other family member; Osteoarthritis in her mother.    Current Outpatient Medications:     albuterol 2.5 mg /3 mL (0.083 %) nebulizer solution, USE 1 UNIT DOSE EVERY 6 HOURS AS NEEDED FOR WHEEZING or shortness of breath., Disp: , Rfl:     albuterol 2.5 mg /3 mL (0.083 %) nebulizer solution, Inhale., Disp: , Rfl:     albuterol 90 mcg/actuation inhaler, INHALE 1 TO 2 PUFFS EVERY 4 TO 6 HOURS AS NEEDED.  If shortness of breath or wheezing, Disp: , Rfl:     aspirin 81 mg EC tablet, Take 1 tablet (81 mg) by mouth once daily., Disp: , Rfl:     budesonide (Pulmicort) 0.5 mg/2 mL nebulizer solution, USE 1 UNIT DOSE VIA NEBULIZER TWO TIMES A DAY, rinse mouth with water after each use, Disp: 120 mL, Rfl: 11    cetirizine (ZyrTEC) 10 mg tablet, Take 1 tablet (10 mg) by mouth if needed., Disp: , Rfl:     desonide (DesOwen) 0.05 % ointment, USE AS DIRECTED TWICE DAILY FOR 2 WEEKS, Disp: , Rfl:     diclofenac sodium 1 % kit, Apply topically 2 times a day as needed., Disp: , Rfl:     diclofenac-misoprostoL (Arthrotec 75)  mg-mcg EC tablet, 1 p.o. twice daily with food., Disp: 180 tablet, Rfl: 3    gabapentin (Neurontin) 300 mg capsule, Take 2 capsules (600 mg) by mouth 3 times a day., Disp: 540 capsule, Rfl: 0    hydrocortisone 2.5 % ointment, apply to affected area twice daily for no more than 2 weeks  follow up with aquaphor, Disp: , Rfl:     L. acidophilus/Bifid. animalis 15.5 billion cell capsule, Take by mouth once daily., Disp: , Rfl:     lidocaine (Lidoderm) 5 % patch, Place 1 patch over 12 hours on the skin once  daily. Apply to painful area 12 hours per day, remove for 12 hours., Disp: 30 patch, Rfl: 3    melatonin 5 mg tablet, Take by mouth., Disp: , Rfl:     multivit-iron-minerals-folic acid (Centrum Silver) 0.4 mg-300 mcg- 250 mcg tab, Take 1 tablet by mouth once daily., Disp: , Rfl:     multivitamin with minerals (multivit-min-iron fum-folic ac) tablet, Take 1 tablet by mouth once daily., Disp: , Rfl:     naloxone (Narcan) 4 mg/0.1 mL nasal spray, Administer 1 spray (4 mg) into affected nostril(s) if needed for opioid reversal. May repeat every 2-3 minutes if needed, alternating nostrils, until medical assistance becomes available., Disp: 2 each, Rfl: 0    nebulizer accessories kit, 1 kit once daily., Disp: 1 each, Rfl: 0    pantoprazole (ProtoNix) 40 mg EC tablet, Take 1 tablet (40 mg) by mouth once daily in the morning. Take before meals., Disp: 90 tablet, Rfl: 3    potassium chloride CR 10 mEq ER tablet, TAKE ONE TABLET BY MOUTH EVERY MORNING AND TAKE ONE TABLET BEFORE BED. DO NOT CRUSH, CHEW OR SPLIT., Disp: 60 tablet, Rfl: 0    rosuvastatin (Crestor) 20 mg tablet, TAKE 1 TABLET ONCE DAILY (DISCONTINUE SIMVASTATIN), Disp: 90 tablet, Rfl: 3    tiZANidine (Zanaflex) 4 mg tablet, Take 1 tablet (4 mg) by mouth every 6 hours if needed for muscle spasms., Disp: , Rfl:     traMADol (Ultram) 50 mg tablet, Take 1 tablet (50 mg) by mouth every 8 hours if needed for severe pain (7 - 10)., Disp: 90 tablet, Rfl: 1    valsartan (Diovan) 80 mg tablet, Take 1 tablet (80 mg) by mouth once daily., Disp: 90 tablet, Rfl: 3    furosemide (Lasix) 20 mg tablet, Take 1 tablet (20 mg) by mouth in the morning and 1 tablet (20 mg) before bedtime., Disp: 60 tablet, Rfl: 11    gabapentin (Neurontin) 300 mg capsule, Take 1 capsule (300 mg) by mouth every 8 hours., Disp: 270 capsule, Rfl: 0    magnesium hydroxide (Milk of Magnesia) 400 mg/5 mL suspension, Take by mouth once daily as needed for constipation. (Patient not taking: Reported on  12/2/2024), Disp: , Rfl:   Allergies   Allergen Reactions    Nickel Itching    Codeine Unknown and Nausea/vomiting    Pyrilamine-Phenylephrin-Dm Tan Palpitations       Physical Examination:    General: Well developed, awake/alert/oriented x3, no distress, alert and cooperative  Skin: Warm and dry, no lesions, no rashes  ENMT: Mucous membranes moist, no apparent injury, no lesions seen  Head/Neck: Neck Supple, no apparent injury  Respiratory/Thorax: Normal breath sounds with good chest expansion, thorax symmetric  Cardiovascular: No pitting edema, no JVD    Motor Strength: 5/5 Throughout all extremities    Muscle Bulk: Normal and symmetric in all extremities    Posture:   -- Cervical: Normal  -- Thoracic: Normal  -- Lumbar : Normal  Paraspinal muscle spasm/tenderness absent.     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 Kyphotic deformity on posture. Post op changes in her low back.     Assessment and Plan:      Lizbet Beltrán is a 74 y.o. 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     At 3/12/24 visit, patient presents with significant neck pain that has made it difficult for her to lift her head to look straight forward.  She also has significant low back pain with radiation into her left leg and down the back of the thigh and front of the thigh.  She also has right knee pain but she says that is because she needs a knee replacement.  She has had numerous back operations over the years.  She has an epidural stimulator in her spine due to her chronic back pain.  That was implanted prior to her last fusion operation.  Her recent CAT scan shows that the screws are all solidly placed in her lumbar spine but the bony  connection between the vertebra is minimal.  Prior to her cervical operation she was complaining of balance difficulty and difficulty with numbness and tingling in her hands.  She was found to have severe cord compression.  An anterior cervical operation at 3 levels was performed.  She developed a fusion at C3-4 but she did not fused at C4-5 nor C5-6 and the cages and screw construct at each of those levels has settled into the vertebral bodies and almost destroyed them.    Today, she continues with significant neck pain and intermittent numbness and tingling in the arms. She has difficulty with horizontal gaze. She continues to use a bone stimulator. She wears a hard collar intermittently. She uses a walker secondary to difficulty ambulating.    At this time, I will get standing scoliosis XR and bone density study to evaluate for osteoporosis. I would like to get her new flexion extension XR of the neck. I discussed the risks and benefits of a posterior cervical decompression and instrumented fusion as a last resort. I will call her with results of her imaging. She will follow up as needed for surgery.      I have reviewed all prior documentation and reviewed the electronic medical record since admission. I have personally have reviewed all advanced imaging not just the reports and used my interpretation as documented as the relevant findings. I have reviewed the risks and benefits of all treatment recommendations listed in this note with the patient and family.       The above clinical summary has been dictated with voice recognition software. It has not been proofread for grammatical errors, typographical mistakes, or other semantic inconsistencies.    Thank you for visiting our office today. It was our pleasure to take part in your healthcare.     Do not hesitate to call with any questions regarding your plan of care after leaving at [phone] M-F 8am-4pm.     To clinicians, thank you very much for this kind  referral. It is a privilege to partner with you in the care of your patients. My office would be delighted to assist you with any further consultations or with questions regarding the plan of care outlined. Do not hesitate to call the office or contact me directly.       Sincerely,      Gatito Alvarez MD, [facility]  Spine , [facility]  Ron Quintana Chair in Spinal Neurosurgery  Complex Spine Surgery Fellowship Director   of Neurological Surgery  [facility] [employer]  Phone: [phone]  Fax: [phone]        Scribe Attestation  By signing my name below, I, Lin Margo Sullivan   attest that this documentation has been prepared under the direction and in the presence of Gatito Alvarez MD.

## 2024-12-04 ENCOUNTER — HOSPITAL ENCOUNTER (OUTPATIENT)
Dept: RADIOLOGY | Facility: HOSPITAL | Age: 74
Discharge: HOME | End: 2024-12-04
Payer: MEDICARE

## 2024-12-04 PROCEDURE — 72082 X-RAY EXAM ENTIRE SPI 2/3 VW: CPT

## 2024-12-04 PROCEDURE — 72082 X-RAY EXAM ENTIRE SPI 2/3 VW: CPT | Performed by: STUDENT IN AN ORGANIZED HEALTH CARE EDUCATION/TRAINING PROGRAM

## 2024-12-06 ENCOUNTER — HOSPITAL ENCOUNTER (OUTPATIENT)
Dept: RADIOLOGY | Facility: CLINIC | Age: 74
Discharge: HOME | End: 2024-12-06
Payer: MEDICARE

## 2024-12-06 DIAGNOSIS — M81.0 AGE-RELATED OSTEOPOROSIS WITHOUT CURRENT PATHOLOGICAL FRACTURE: ICD-10-CM

## 2024-12-06 PROCEDURE — 77080 DXA BONE DENSITY AXIAL: CPT

## 2024-12-11 ENCOUNTER — OFFICE VISIT (OUTPATIENT)
Dept: PAIN MEDICINE | Facility: CLINIC | Age: 74
End: 2024-12-11
Payer: MEDICARE

## 2024-12-11 VITALS
WEIGHT: 243 LBS | TEMPERATURE: 97.2 F | RESPIRATION RATE: 18 BRPM | BODY MASS INDEX: 36.83 KG/M2 | HEART RATE: 77 BPM | DIASTOLIC BLOOD PRESSURE: 78 MMHG | HEIGHT: 68 IN | OXYGEN SATURATION: 97 % | SYSTOLIC BLOOD PRESSURE: 171 MMHG

## 2024-12-11 DIAGNOSIS — M54.12 CERVICAL NEURITIS: ICD-10-CM

## 2024-12-11 DIAGNOSIS — M96.1 POSTLAMINECTOMY SYNDROME, LUMBAR: ICD-10-CM

## 2024-12-11 DIAGNOSIS — M19.011 PRIMARY OSTEOARTHRITIS OF RIGHT SHOULDER: Primary | ICD-10-CM

## 2024-12-11 DIAGNOSIS — M48.07 LUMBOSACRAL STENOSIS: ICD-10-CM

## 2024-12-11 DIAGNOSIS — M16.9 OSTEOARTHROSIS, HIP: ICD-10-CM

## 2024-12-11 DIAGNOSIS — M47.22 OSTEOARTHRITIS OF SPINE WITH RADICULOPATHY, CERVICAL REGION: ICD-10-CM

## 2024-12-11 DIAGNOSIS — M54.16 LUMBAR RADICULOPATHY: ICD-10-CM

## 2024-12-11 DIAGNOSIS — M48.062 LUMBAR STENOSIS WITH NEUROGENIC CLAUDICATION: ICD-10-CM

## 2024-12-11 DIAGNOSIS — Z98.890 STATUS POST LUMBAR LAMINECTOMY: ICD-10-CM

## 2024-12-11 PROCEDURE — 1123F ACP DISCUSS/DSCN MKR DOCD: CPT | Performed by: ANESTHESIOLOGY

## 2024-12-11 PROCEDURE — 3078F DIAST BP <80 MM HG: CPT | Performed by: ANESTHESIOLOGY

## 2024-12-11 PROCEDURE — 3077F SYST BP >= 140 MM HG: CPT | Performed by: ANESTHESIOLOGY

## 2024-12-11 PROCEDURE — 99213 OFFICE O/P EST LOW 20 MIN: CPT | Performed by: ANESTHESIOLOGY

## 2024-12-11 PROCEDURE — 1125F AMNT PAIN NOTED PAIN PRSNT: CPT | Performed by: ANESTHESIOLOGY

## 2024-12-11 PROCEDURE — 3008F BODY MASS INDEX DOCD: CPT | Performed by: ANESTHESIOLOGY

## 2024-12-11 PROCEDURE — 1159F MED LIST DOCD IN RCRD: CPT | Performed by: ANESTHESIOLOGY

## 2024-12-11 PROCEDURE — 1036F TOBACCO NON-USER: CPT | Performed by: ANESTHESIOLOGY

## 2024-12-11 RX ORDER — TRAMADOL HYDROCHLORIDE 50 MG/1
50 TABLET ORAL EVERY 8 HOURS PRN
Qty: 90 TABLET | Refills: 1 | Status: SHIPPED | OUTPATIENT
Start: 2024-12-11 | End: 2025-01-10

## 2024-12-11 ASSESSMENT — ENCOUNTER SYMPTOMS
DEPRESSION: 0
OCCASIONAL FEELINGS OF UNSTEADINESS: 1
LOSS OF SENSATION IN FEET: 0

## 2024-12-11 ASSESSMENT — PAIN SCALES - GENERAL: PAINLEVEL_OUTOF10: 7

## 2024-12-11 NOTE — PROGRESS NOTES
Subjective   Patient ID: Lizbet Beltrán is a 74 y.o. female is here today for medication management.  She has had multiple cervical laminectomies as well as lumbar laminectomies and suffers from chronic pain syndrome.  Patient takes tramadol 50 mg tablets 3 times a day.    HPI  Anteriors cervical laminectomy and fusion;.  Medication management; lumbosacral radiculopathy; neuropathic pain syndrome  Review of Systems  Unremarkable except chief complaint   Objective   Physical Exam  Gen. appearance:  Patient's alert and oriented ×3 ;cooperative mild to moderate distress  Heart: Regular rate and rhythm  Lungs: Clear to auscultation  Abdomen: Soft nontender  Neuro: Cranial nerves II -XII intact; DTR: +2 over 4 biceps triceps brachial radialis patellar and Achilles  Spinal exam: Positive paraspinal tenderness noted bilaterally L4 5 L5-S1 with flexion extension and rotation/no bony abnormalities  Musculoskeletal:  Upper and lower extremity strength was 4/5 bilaterally  :  deferred  Skin: Warm and dry      Assessment/Plan   Diagnoses and all orders for this visit:  Postlaminectomy syndrome, lumbar  -     Follow Up In Pain Medicine  -     traMADol (Ultram) 50 mg tablet; Take 1 tablet (50 mg) by mouth every 8 hours if needed for severe pain (7 - 10).  Status post lumbar laminectomy  -     Follow Up In Pain Medicine  Lumbar stenosis with neurogenic claudication  -     Follow Up In Pain Medicine  -     traMADol (Ultram) 50 mg tablet; Take 1 tablet (50 mg) by mouth every 8 hours if needed for severe pain (7 - 10).  Lumbar radiculopathy  -     Follow Up In Pain Medicine  -     traMADol (Ultram) 50 mg tablet; Take 1 tablet (50 mg) by mouth every 8 hours if needed for severe pain (7 - 10).  Cervical neuritis  -     Follow Up In Pain Medicine  -     traMADol (Ultram) 50 mg tablet; Take 1 tablet (50 mg) by mouth every 8 hours if needed for severe pain (7 - 10).  Lumbosacral stenosis  -     traMADol (Ultram) 50 mg tablet; Take 1  tablet (50 mg) by mouth every 8 hours if needed for severe pain (7 - 10).  Osteoarthrosis, hip  -     traMADol (Ultram) 50 mg tablet; Take 1 tablet (50 mg) by mouth every 8 hours if needed for severe pain (7 - 10).    Risk and benefits continuous opioid therapy was discussed.  Patient was given a refill of tramadol 50 mg tablets #90 with 1 refill.  She has a follow-up visit scheduled in 8 weeks

## 2024-12-16 ENCOUNTER — OFFICE VISIT (OUTPATIENT)
Dept: PULMONOLOGY | Facility: CLINIC | Age: 74
End: 2024-12-16
Payer: MEDICARE

## 2024-12-16 VITALS
SYSTOLIC BLOOD PRESSURE: 142 MMHG | DIASTOLIC BLOOD PRESSURE: 88 MMHG | TEMPERATURE: 98.1 F | HEIGHT: 68 IN | OXYGEN SATURATION: 97 % | RESPIRATION RATE: 18 BRPM | HEART RATE: 77 BPM | WEIGHT: 240 LBS | BODY MASS INDEX: 36.37 KG/M2

## 2024-12-16 DIAGNOSIS — Z87.891 HISTORY OF NICOTINE DEPENDENCE: Primary | ICD-10-CM

## 2024-12-16 DIAGNOSIS — J45.909 MILD ASTHMA, UNSPECIFIED WHETHER COMPLICATED, UNSPECIFIED WHETHER PERSISTENT (HHS-HCC): ICD-10-CM

## 2024-12-16 PROCEDURE — 99213 OFFICE O/P EST LOW 20 MIN: CPT | Performed by: STUDENT IN AN ORGANIZED HEALTH CARE EDUCATION/TRAINING PROGRAM

## 2024-12-16 PROCEDURE — 1123F ACP DISCUSS/DSCN MKR DOCD: CPT | Performed by: STUDENT IN AN ORGANIZED HEALTH CARE EDUCATION/TRAINING PROGRAM

## 2024-12-16 PROCEDURE — 3008F BODY MASS INDEX DOCD: CPT | Performed by: STUDENT IN AN ORGANIZED HEALTH CARE EDUCATION/TRAINING PROGRAM

## 2024-12-16 PROCEDURE — 3079F DIAST BP 80-89 MM HG: CPT | Performed by: STUDENT IN AN ORGANIZED HEALTH CARE EDUCATION/TRAINING PROGRAM

## 2024-12-16 PROCEDURE — 3077F SYST BP >= 140 MM HG: CPT | Performed by: STUDENT IN AN ORGANIZED HEALTH CARE EDUCATION/TRAINING PROGRAM

## 2024-12-16 PROCEDURE — 1159F MED LIST DOCD IN RCRD: CPT | Performed by: STUDENT IN AN ORGANIZED HEALTH CARE EDUCATION/TRAINING PROGRAM

## 2024-12-16 RX ORDER — ALBUTEROL SULFATE 90 UG/1
2 INHALANT RESPIRATORY (INHALATION)
Qty: 18 G | Refills: 11 | Status: SHIPPED | OUTPATIENT
Start: 2024-12-16

## 2024-12-16 RX ORDER — BUDESONIDE 0.5 MG/2ML
INHALANT ORAL
Qty: 120 ML | Refills: 11 | Status: SHIPPED | OUTPATIENT
Start: 2024-12-16

## 2024-12-16 ASSESSMENT — ENCOUNTER SYMPTOMS
ABDOMINAL PAIN: 0
ABDOMINAL DISTENTION: 0
CHILLS: 0
UNEXPECTED WEIGHT CHANGE: 0
FEVER: 0
PALPITATIONS: 0

## 2024-12-16 ASSESSMENT — ASTHMA QUESTIONNAIRES
QUESTION_4 LAST FOUR WEEKS HOW OFTEN HAVE YOU USED YOUR RESCUE INHALER OR NEBULIZER MEDICATION (SUCH AS ALBUTEROL): NOT AT ALL
QUESTION_5 LAST FOUR WEEKS HOW WOULD YOU RATE YOUR ASTHMA CONTROL: COMPLETELY CONTROLLED
QUESTION_3 LAST FOUR WEEKS HOW OFTEN DID YOUR ASTHMA SYMPTOMS (WHEEZING, COUGHING, SHORTNESS OF BREATH, CHEST TIGHTNESS OR PAIN) WAKE YOU UP AT NIGHT OR EARLIER THAN USUAL IN THE MORNING: NOT AT ALL
ACT_TOTALSCORE: 25
QUESTION_1 LAST FOUR WEEKS HOW MUCH OF THE TIME DID YOUR ASTHMA KEEP YOU FROM GETTING AS MUCH DONE AT WORK, SCHOOL OR AT HOME: NONE OF THE TIME
QUESTION_2 LAST FOUR WEEKS HOW OFTEN HAVE YOU HAD SHORTNESS OF BREATH: NOT AT ALL

## 2024-12-16 NOTE — PROGRESS NOTES
Subjective   Patient ID: Lizbet Beltrán is a 74 y.o. female who presents for Asthma ([facility]).  Asthma  Pertinent negatives include no chest pain or fever. Her past medical history is significant for asthma.     12/2024   Had covid early November--received medrol dose pack and z pack from pcp   Otherwise asthma symptoms have been stable     ACT: 25      12/13/2023    Accompanied by her    Has sinus infection a month ago --got antibiotics   No increase in respiratory symptoms since last time   Overall feels like she is doing well from a respiratory standpoint   MMRC: 1   ACT 21     Pulmonary medications: is on budesonide q12hrs       Last visit in May 2023   Previously seen by Dr. Chaudhry   on budesonide nebulizer q12hr for her asthma   symptoms are controlled   no hospitalization or exacerbations since her last pulm visit  does have somesinus rinses for her sinus problems  Does have GERD and started on ppi        PMH/PSH: cervical stenosis, neck fusion ( within a year) , hiatal hernia,barretts esophagus  FH: sister with breast cancer , sister with lung cancer   SH: quit 11 years ago, 1/2 ppd for 30 years . On disability from working in school cafeteria.      Pulm meds: budesonide nebulizer and prn albuterol, does take zyrtec   Review of Systems   Constitutional:  Negative for chills, fever and unexpected weight change.   Cardiovascular:  Negative for chest pain, palpitations and leg swelling.   Gastrointestinal:  Negative for abdominal distention and abdominal pain.   Skin:  Negative for rash.       Objective   Vitals:    12/16/24 1006   BP: 142/88   Pulse: 77   Resp: 18   Temp: 36.7 °C (98.1 °F)   SpO2: 97%       Physical Exam  Vitals reviewed.   Constitutional:       General: She is awake.   Cardiovascular:      Rate and Rhythm: Normal rate and regular rhythm.   Pulmonary:      Effort: Pulmonary effort is normal.      Breath sounds: Normal breath sounds.   Neurological:      Mental Status: She is alert and  oriented to person, place, and time.   Psychiatric:         Attention and Perception: Attention and perception normal.         Behavior: Behavior normal.       CT scan 5/2023  IMPRESSION:  1. Tiny 3 mm right apical nodule, likely benign. Continued screening  with low-dose noncontrast chest CT in 12 months (from current date)  is recommended.  2. Mild upper lung predominant emphysema.    CT 5/2024   IMPRESSION:  1. No suspicious pulmonary nodules identified. Continued screening  with low-dose noncontrast chest CT in 12 months (from current date)  is recommended.  2. Mild upper lung predominant emphysema.  3. Small hiatal hernia.      Assessment/Plan       Lizbet Beltrán is a 74 y.o. year old female patient with   referred for Follow Up In Pulmonology.     Asthma--mild persistent   -continue budesonide nebulizer as prescribed--refilled today   -continue albuterol inh   -stay up to date on vaccinations   -control of gerd with ppi      lung ca screening  -due for lung cancer screening in May 2025      rtc in 12 months or sooner if needed     Thu Gu MD 12/16/24 10:24 AM

## 2024-12-16 NOTE — PATIENT INSTRUCTIONS
Thank you for visiting the [facility] today.   Your breathing medications: budesonide nebulizer,  albuterol as needed   Tests: CT scan in May 2025   Return in 12 months or sooner   If you have questions or concerns, call [phone] (option 4)

## 2024-12-30 ENCOUNTER — TELEPHONE (OUTPATIENT)
Dept: PRIMARY CARE | Facility: CLINIC | Age: 74
End: 2024-12-30

## 2025-01-27 ENCOUNTER — OFFICE VISIT (OUTPATIENT)
Dept: PAIN MEDICINE | Facility: CLINIC | Age: 75
End: 2025-01-27
Payer: MEDICARE

## 2025-01-27 VITALS
BODY MASS INDEX: 36.37 KG/M2 | RESPIRATION RATE: 18 BRPM | WEIGHT: 240 LBS | SYSTOLIC BLOOD PRESSURE: 152 MMHG | OXYGEN SATURATION: 98 % | HEIGHT: 68 IN | TEMPERATURE: 97.3 F | DIASTOLIC BLOOD PRESSURE: 64 MMHG | HEART RATE: 78 BPM

## 2025-01-27 DIAGNOSIS — G58.7 MONONEURITIS MULTIPLEX: ICD-10-CM

## 2025-01-27 DIAGNOSIS — Z98.890 STATUS POST LUMBAR LAMINECTOMY: ICD-10-CM

## 2025-01-27 DIAGNOSIS — M54.16 LUMBAR RADICULOPATHY: ICD-10-CM

## 2025-01-27 DIAGNOSIS — M48.062 LUMBAR STENOSIS WITH NEUROGENIC CLAUDICATION: ICD-10-CM

## 2025-01-27 DIAGNOSIS — Z96.659 POSTOPERATIVE STIFFNESS OF TOTAL KNEE REPLACEMENT, SUBSEQUENT ENCOUNTER: Primary | ICD-10-CM

## 2025-01-27 DIAGNOSIS — M54.12 CERVICAL NEURITIS: ICD-10-CM

## 2025-01-27 DIAGNOSIS — M48.02 CERVICAL STENOSIS OF SPINE: ICD-10-CM

## 2025-01-27 DIAGNOSIS — M25.669 POSTOPERATIVE STIFFNESS OF TOTAL KNEE REPLACEMENT, SUBSEQUENT ENCOUNTER: Primary | ICD-10-CM

## 2025-01-27 DIAGNOSIS — M96.1 POSTLAMINECTOMY SYNDROME, LUMBAR: ICD-10-CM

## 2025-01-27 DIAGNOSIS — T84.89XD POSTOPERATIVE STIFFNESS OF TOTAL KNEE REPLACEMENT, SUBSEQUENT ENCOUNTER: Primary | ICD-10-CM

## 2025-01-27 PROCEDURE — 1125F AMNT PAIN NOTED PAIN PRSNT: CPT | Performed by: ANESTHESIOLOGY

## 2025-01-27 PROCEDURE — 1159F MED LIST DOCD IN RCRD: CPT | Performed by: ANESTHESIOLOGY

## 2025-01-27 PROCEDURE — 99213 OFFICE O/P EST LOW 20 MIN: CPT | Performed by: ANESTHESIOLOGY

## 2025-01-27 PROCEDURE — 1123F ACP DISCUSS/DSCN MKR DOCD: CPT | Performed by: ANESTHESIOLOGY

## 2025-01-27 PROCEDURE — 1160F RVW MEDS BY RX/DR IN RCRD: CPT | Performed by: ANESTHESIOLOGY

## 2025-01-27 PROCEDURE — 3078F DIAST BP <80 MM HG: CPT | Performed by: ANESTHESIOLOGY

## 2025-01-27 PROCEDURE — 3077F SYST BP >= 140 MM HG: CPT | Performed by: ANESTHESIOLOGY

## 2025-01-27 PROCEDURE — 1036F TOBACCO NON-USER: CPT | Performed by: ANESTHESIOLOGY

## 2025-01-27 PROCEDURE — 3008F BODY MASS INDEX DOCD: CPT | Performed by: ANESTHESIOLOGY

## 2025-01-27 RX ORDER — GABAPENTIN 300 MG/1
600 CAPSULE ORAL 3 TIMES DAILY
Qty: 540 CAPSULE | Refills: 1 | Status: SHIPPED | OUTPATIENT
Start: 2025-01-27 | End: 2025-04-27

## 2025-01-27 RX ORDER — TRAMADOL HYDROCHLORIDE 50 MG/1
50 TABLET ORAL EVERY 8 HOURS
Qty: 90 TABLET | Refills: 1 | Status: SHIPPED | OUTPATIENT
Start: 2025-01-27 | End: 2025-02-26

## 2025-01-27 ASSESSMENT — ENCOUNTER SYMPTOMS
OCCASIONAL FEELINGS OF UNSTEADINESS: 1
DEPRESSION: 0
LOSS OF SENSATION IN FEET: 0

## 2025-01-27 ASSESSMENT — PAIN SCALES - GENERAL: PAINLEVEL_OUTOF10: 7

## 2025-01-27 NOTE — PROGRESS NOTES
Subjective   Patient ID: Lizbet Beltrán is a 74 y.o. female who presents fo chronic Pain.  Patient has had both cervical laminectomy and lumbar laminectomy with bilateral total knee replacements.  She has peripheral neuropathy which has been controlled with gabapentin 600 mg 3 times a day.  She also has been taking tramadol 50 mg 3 times a day.  Patient is requesting refills of both medications.  She is alert and oriented x 3 and cooperative with no severe distress  HPI  Prior history of cervical and lumbar laminectomy syndromes; polyneuropathy; history of cervical spinal stenosis; continuous opioid use for pain management  Review of Systems  Patient is well-controlled on her current medications and is requesting refills  Objective   Physical Exam  Gen. appearance:  Patient's alert and oriented ×3 ;cooperative mild to moderate distress  Heart: Regular rate and rhythm  Lungs: Clear to auscultation  Abdomen: Soft nontender  Neuro: Cranial nerves II -XII intact; DTR: +2 over 4 biceps triceps brachial radialis patellar and Achilles  Spinal exam: Positive paraspinal tenderness noted bilaterally L4 5 L5-S1 with flexion extension and rotation/no bony abnormalities  Musculoskeletal:  Upper and lower extremity strength was 4/5 bilaterally  :  deferred  Skin: Warm and dry      Assessment/Plan   Diagnoses and all orders for this visit:  Postoperative stiffness of total knee replacement, subsequent encounter  Status post lumbar laminectomy  -     gabapentin (Neurontin) 300 mg capsule; Take 2 capsules (600 mg) by mouth 3 times a day.  -     traMADol (Ultram) 50 mg tablet; Take 1 tablet (50 mg) by mouth every 8 hours.  -     Follow Up In Pain Medicine; Future  Postlaminectomy syndrome, lumbar  -     gabapentin (Neurontin) 300 mg capsule; Take 2 capsules (600 mg) by mouth 3 times a day.  -     traMADol (Ultram) 50 mg tablet; Take 1 tablet (50 mg) by mouth every 8 hours.  -     Follow Up In Pain Medicine; Future  Lumbar  stenosis with neurogenic claudication  -     gabapentin (Neurontin) 300 mg capsule; Take 2 capsules (600 mg) by mouth 3 times a day.  -     traMADol (Ultram) 50 mg tablet; Take 1 tablet (50 mg) by mouth every 8 hours.  -     Follow Up In Pain Medicine; Future  Lumbar radiculopathy  -     gabapentin (Neurontin) 300 mg capsule; Take 2 capsules (600 mg) by mouth 3 times a day.  Cervical neuritis  -     gabapentin (Neurontin) 300 mg capsule; Take 2 capsules (600 mg) by mouth 3 times a day.  Cervical stenosis of spine  Mononeuritis multiplex         Brian Wang DO 01/27/25 10:30 AM

## 2025-01-27 NOTE — PROGRESS NOTES
Pain #7/10 to neck, lower back and right knee.  Comes and goes depending on the time of the day and her activity level.   Taking 3 Tramadol per day and has 30 tablets left.  Takes Gabapentin TID.

## 2025-02-05 ENCOUNTER — APPOINTMENT (OUTPATIENT)
Dept: PAIN MEDICINE | Facility: CLINIC | Age: 75
End: 2025-02-05
Payer: MEDICARE

## 2025-02-18 DIAGNOSIS — R09.89 LABILE HYPERTENSION: ICD-10-CM

## 2025-02-18 RX ORDER — VALSARTAN 80 MG/1
80 TABLET ORAL DAILY
Qty: 90 TABLET | Refills: 3 | Status: SHIPPED | OUTPATIENT
Start: 2025-02-18

## 2025-03-19 ENCOUNTER — APPOINTMENT (OUTPATIENT)
Dept: PRIMARY CARE | Facility: CLINIC | Age: 75
End: 2025-03-19
Payer: MEDICARE

## 2025-03-19 VITALS
WEIGHT: 240 LBS | DIASTOLIC BLOOD PRESSURE: 63 MMHG | TEMPERATURE: 97.9 F | HEIGHT: 68 IN | BODY MASS INDEX: 36.37 KG/M2 | HEART RATE: 82 BPM | RESPIRATION RATE: 20 BRPM | SYSTOLIC BLOOD PRESSURE: 140 MMHG | OXYGEN SATURATION: 95 %

## 2025-03-19 DIAGNOSIS — M43.10 DEGENERATIVE SPONDYLOLISTHESIS: ICD-10-CM

## 2025-03-19 DIAGNOSIS — R53.81 MALAISE AND FATIGUE: ICD-10-CM

## 2025-03-19 DIAGNOSIS — K63.3: ICD-10-CM

## 2025-03-19 DIAGNOSIS — Z96.652 HISTORY OF TOTAL LEFT KNEE REPLACEMENT: ICD-10-CM

## 2025-03-19 DIAGNOSIS — G95.9 CERVICAL MYELOPATHY: ICD-10-CM

## 2025-03-19 DIAGNOSIS — R60.0 BILATERAL LEG EDEMA: ICD-10-CM

## 2025-03-19 DIAGNOSIS — I10 PRIMARY HYPERTENSION: Primary | ICD-10-CM

## 2025-03-19 DIAGNOSIS — M12.9 ARTHROPATHY OF MULTIPLE SITES: ICD-10-CM

## 2025-03-19 DIAGNOSIS — Z98.1 STATUS POST LUMBAR SPINAL FUSION: ICD-10-CM

## 2025-03-19 DIAGNOSIS — E78.5 DYSLIPIDEMIA: ICD-10-CM

## 2025-03-19 DIAGNOSIS — R53.83 MALAISE AND FATIGUE: ICD-10-CM

## 2025-03-19 PROCEDURE — 1036F TOBACCO NON-USER: CPT | Performed by: FAMILY MEDICINE

## 2025-03-19 PROCEDURE — 1126F AMNT PAIN NOTED NONE PRSNT: CPT | Performed by: FAMILY MEDICINE

## 2025-03-19 PROCEDURE — 3078F DIAST BP <80 MM HG: CPT | Performed by: FAMILY MEDICINE

## 2025-03-19 PROCEDURE — 1159F MED LIST DOCD IN RCRD: CPT | Performed by: FAMILY MEDICINE

## 2025-03-19 PROCEDURE — 99214 OFFICE O/P EST MOD 30 MIN: CPT | Performed by: FAMILY MEDICINE

## 2025-03-19 PROCEDURE — 1123F ACP DISCUSS/DSCN MKR DOCD: CPT | Performed by: FAMILY MEDICINE

## 2025-03-19 PROCEDURE — 1160F RVW MEDS BY RX/DR IN RCRD: CPT | Performed by: FAMILY MEDICINE

## 2025-03-19 PROCEDURE — 3008F BODY MASS INDEX DOCD: CPT | Performed by: FAMILY MEDICINE

## 2025-03-19 PROCEDURE — 3077F SYST BP >= 140 MM HG: CPT | Performed by: FAMILY MEDICINE

## 2025-03-19 RX ORDER — OMEPRAZOLE 40 MG/1
40 CAPSULE, DELAYED RELEASE ORAL
COMMUNITY

## 2025-03-19 RX ORDER — FUROSEMIDE 20 MG/1
20 TABLET ORAL 2 TIMES DAILY
Qty: 60 TABLET | Refills: 11 | Status: SHIPPED | OUTPATIENT
Start: 2025-03-19 | End: 2026-03-19

## 2025-03-19 RX ORDER — TRAMADOL HYDROCHLORIDE 50 MG/1
50 TABLET ORAL EVERY 8 HOURS PRN
COMMUNITY

## 2025-03-19 RX ORDER — LIDOCAINE 50 MG/G
1 PATCH TOPICAL DAILY
Qty: 30 PATCH | Refills: 3 | Status: SHIPPED | OUTPATIENT
Start: 2025-03-19 | End: 2026-03-19

## 2025-03-19 RX ORDER — FAMOTIDINE 20 MG/1
20 TABLET, FILM COATED ORAL DAILY
COMMUNITY

## 2025-03-19 ASSESSMENT — PAIN SCALES - GENERAL: PAINLEVEL_OUTOF10: 0-NO PAIN

## 2025-03-19 NOTE — PROGRESS NOTES
Subjective   Lizbet Beltrán is a 74 y.o. female who presents for Follow-up (Follow up visit, patient ).    HPI  : Patient is a 74-year-old female who has multiple medical problems and is being evaluated today for follow-up visit and recheck on blood work and blood pressure.  She recently had a colonoscopy in February 2025 and does have some mucosal ulcerations.  We did decrease her Arthrotec to once daily and she also is taking omeprazole 40 mg daily.  She states without the Arthrotec she would be unable to walk due to her severe arthritis.  She also has a history of cervical spine surgery 2-1/2 years ago, and lumbosacral spine surgery about a year and a half ago.  Patient also needs a refill on her furosemide and lidocaine patches.  She had a revision on her left knee replacement about 3 years ago and is ambulating with a wheeled walker.  Patient also follows with pain management for nerve blocks.    Objective  : ROS:10 systems were reviewed and the information is included in the HPI and no additional review of systems is indicated.    Physical Exam  Vitals and nursing note reviewed.   Constitutional:       Appearance: Normal appearance. She is obese.      Comments: Patient is alert and oriented x3.   No acute distress   HENT:      Head: Normocephalic.      Right Ear: Tympanic membrane and external ear normal.      Left Ear: Tympanic membrane and external ear normal.      Ears:      Comments: Ears are patent bilaterally and TMs are clear.     Nose: Nose normal.      Mouth/Throat:      Mouth: Mucous membranes are moist.      Pharynx: Oropharynx is clear.      Comments: Mouth is moist, tongue is midline.  No posterior pharyngeal erythema.  Eyes:      Extraocular Movements: Extraocular movements intact.      Conjunctiva/sclera: Conjunctivae normal.      Pupils: Pupils are equal, round, and reactive to light.      Comments: No visual disturbance, does have her eyes examined once a year.   Neck:      Comments: Had  cervical fusion  2  1/2  years ago. Still with pain and problems.   No carotid bruits, no thyromegaly, no cervical adenopathy.    Cardiovascular:      Rate and Rhythm: Normal rate and regular rhythm.      Pulses: Normal pulses.      Heart sounds: Normal heart sounds.      Comments: Patient denies chest pain and no palpitations.  Heart rhythm is stable S1 and S2 are noted, no ectopics.  Pulmonary:      Effort: Pulmonary effort is normal.      Breath sounds: Normal breath sounds.      Comments: Lungs are clear but patient does have a history of asthmatic bronchitis and uses a home nebulizer machine.   Lungs are clear to auscultation.    Abdominal:      General: Bowel sounds are normal.      Palpations: Abdomen is soft.      Comments: Had upper endoscopy and colonoscopy . Has  peptic  ulcer dx. Abdomen soft and obese.     Genitourinary:     Comments: Patient denies dysuria, no hematuria, denies flank pain.  Patient has bladder injections of Botox due to overactive bladder and urinary incontinence.  She follows closely with urology.  Musculoskeletal:         General: Swelling and tenderness present. Normal range of motion.      Cervical back: Normal range of motion. Tenderness present.      Right lower leg: Edema present.      Left lower leg: Edema present.      Comments: Left knee post revision   and stable.  Osteoarthritis  left knee.  Post lumbar back surgery.  Age-related arthritis in the joints.  Mild restriction of motion cervical and lumbar spines due to muscle spasm.  Increased thoracic kyphosis.  Lymph edema. Ordered leg pumps.   Skin:     General: Skin is warm and dry.      Comments: There is no bruising, no erythema, no skin lesions noted, no rashes.   Neurological:      General: No focal deficit present.      Mental Status: She is alert and oriented to person, place, and time. Mental status is at baseline.      Sensory: Sensory deficit present.      Motor: Weakness present.      Gait: Gait abnormal.       Comments: Using a walker and follows with Pain management.  Having nerve blocks. Unsteady gait.  Weakness of legs.     Psychiatric:         Mood and Affect: Mood normal.         Behavior: Behavior normal.         Thought Content: Thought content normal.         Judgment: Judgment normal.      Comments: Patient has normal mood and affect.  Patient does have anxiety about all her health problems.  Thought content and judgment are stable.  No signs of vascular dementia.       PLAN : Patient is a 74-year-old female with multiple medical problems and significant arthritis in all her joints.  She has had cervical spine surgery and lumbosacral spine surgery and also a left total knee replacement.  She also required a revision of her left knee replacement and follows closely with orthopedics.  She had her blood work drawn today and will be notified of results in 3 days.  Blood pressure and other vitals are stable.  She did is taking the Arthrotec once a day so as not to aggravate her stomach or intestine due to mucosal ulcers.  Patient will continue following with orthopedics, neurosurgery, and pain management.  She will follow-up in this office in 4 to 6 months or sooner as needed.    Problem List Items Addressed This Visit       Hypertension    Relevant Orders    CBC    Comprehensive Metabolic Panel    Lipid Panel    Thyroid Stimulating Hormone     Other Visit Diagnoses       Malaise and fatigue        Relevant Orders    CBC    Comprehensive Metabolic Panel    Lipid Panel    Thyroid Stimulating Hormone    Dyslipidemia        Relevant Orders    CBC    Comprehensive Metabolic Panel    Lipid Panel    Thyroid Stimulating Hormone                 Vito Ordonez DO

## 2025-03-20 LAB
ALBUMIN SERPL-MCNC: 4.3 G/DL (ref 3.6–5.1)
ALP SERPL-CCNC: 68 U/L (ref 37–153)
ALT SERPL-CCNC: 30 U/L (ref 6–29)
ANION GAP SERPL CALCULATED.4IONS-SCNC: 9 MMOL/L (CALC) (ref 7–17)
AST SERPL-CCNC: 25 U/L (ref 10–35)
BILIRUB SERPL-MCNC: 0.3 MG/DL (ref 0.2–1.2)
BUN SERPL-MCNC: 22 MG/DL (ref 7–25)
CALCIUM SERPL-MCNC: 9.2 MG/DL (ref 8.6–10.4)
CHLORIDE SERPL-SCNC: 101 MMOL/L (ref 98–110)
CHOLEST SERPL-MCNC: 174 MG/DL
CHOLEST/HDLC SERPL: 2 (CALC)
CO2 SERPL-SCNC: 28 MMOL/L (ref 20–32)
CREAT SERPL-MCNC: 0.72 MG/DL (ref 0.6–1)
EGFRCR SERPLBLD CKD-EPI 2021: 88 ML/MIN/1.73M2
ERYTHROCYTE [DISTWIDTH] IN BLOOD BY AUTOMATED COUNT: 13.2 % (ref 11–15)
GLUCOSE SERPL-MCNC: 94 MG/DL (ref 65–99)
HCT VFR BLD AUTO: 36.3 % (ref 35–45)
HDLC SERPL-MCNC: 89 MG/DL
HGB BLD-MCNC: 11.6 G/DL (ref 11.7–15.5)
LDLC SERPL CALC-MCNC: 69 MG/DL (CALC)
MCH RBC QN AUTO: 28 PG (ref 27–33)
MCHC RBC AUTO-ENTMCNC: 32 G/DL (ref 32–36)
MCV RBC AUTO: 87.5 FL (ref 80–100)
NONHDLC SERPL-MCNC: 85 MG/DL (CALC)
PLATELET # BLD AUTO: 340 THOUSAND/UL (ref 140–400)
PMV BLD REES-ECKER: 9.1 FL (ref 7.5–12.5)
POTASSIUM SERPL-SCNC: 4.4 MMOL/L (ref 3.5–5.3)
PROT SERPL-MCNC: 7.2 G/DL (ref 6.1–8.1)
RBC # BLD AUTO: 4.15 MILLION/UL (ref 3.8–5.1)
SODIUM SERPL-SCNC: 138 MMOL/L (ref 135–146)
TRIGL SERPL-MCNC: 75 MG/DL
TSH SERPL-ACNC: 2.24 MIU/L (ref 0.4–4.5)
WBC # BLD AUTO: 5.5 THOUSAND/UL (ref 3.8–10.8)

## 2025-03-20 NOTE — RESULT ENCOUNTER NOTE
White blood cell count is normal          she is a borderline   anemic at 11.6       should be above 11.7     she can take a vitamin with iron        blood sugar and kidney function are normal          1 liver enzyme was just up very slightly at 30 and it should be below 29      we can just recheck that later     that could be from all the medications.      Cholesterol is normal at 174     triglycerides are normal at 75      thyroid function is normal            liver blood work is stable       we will just continue the same medications and recheck things in the next visit

## 2025-03-26 ENCOUNTER — APPOINTMENT (OUTPATIENT)
Dept: PRIMARY CARE | Facility: CLINIC | Age: 75
End: 2025-03-26
Payer: MEDICARE

## 2025-04-03 ENCOUNTER — OFFICE VISIT (OUTPATIENT)
Dept: PAIN MEDICINE | Facility: CLINIC | Age: 75
End: 2025-04-03
Payer: MEDICARE

## 2025-04-03 VITALS
WEIGHT: 237 LBS | TEMPERATURE: 97.5 F | OXYGEN SATURATION: 97 % | BODY MASS INDEX: 35.92 KG/M2 | HEART RATE: 68 BPM | HEIGHT: 68 IN | SYSTOLIC BLOOD PRESSURE: 145 MMHG | RESPIRATION RATE: 18 BRPM | DIASTOLIC BLOOD PRESSURE: 65 MMHG

## 2025-04-03 DIAGNOSIS — M48.062 LUMBAR STENOSIS WITH NEUROGENIC CLAUDICATION: ICD-10-CM

## 2025-04-03 DIAGNOSIS — Z98.1 STATUS POST LUMBAR SPINAL FUSION: ICD-10-CM

## 2025-04-03 DIAGNOSIS — M96.1 POSTLAMINECTOMY SYNDROME, LUMBAR: Primary | ICD-10-CM

## 2025-04-03 DIAGNOSIS — Z98.890 STATUS POST LUMBAR LAMINECTOMY: ICD-10-CM

## 2025-04-03 DIAGNOSIS — M54.16 LUMBAR RADICULOPATHY: ICD-10-CM

## 2025-04-03 DIAGNOSIS — G95.9 CERVICAL MYELOPATHY: ICD-10-CM

## 2025-04-03 DIAGNOSIS — M54.12 CERVICAL NEURITIS: ICD-10-CM

## 2025-04-03 PROCEDURE — 1125F AMNT PAIN NOTED PAIN PRSNT: CPT | Performed by: ANESTHESIOLOGY

## 2025-04-03 PROCEDURE — 99213 OFFICE O/P EST LOW 20 MIN: CPT | Performed by: ANESTHESIOLOGY

## 2025-04-03 PROCEDURE — 3008F BODY MASS INDEX DOCD: CPT | Performed by: ANESTHESIOLOGY

## 2025-04-03 PROCEDURE — 3078F DIAST BP <80 MM HG: CPT | Performed by: ANESTHESIOLOGY

## 2025-04-03 PROCEDURE — 1159F MED LIST DOCD IN RCRD: CPT | Performed by: ANESTHESIOLOGY

## 2025-04-03 PROCEDURE — 1160F RVW MEDS BY RX/DR IN RCRD: CPT | Performed by: ANESTHESIOLOGY

## 2025-04-03 PROCEDURE — 1123F ACP DISCUSS/DSCN MKR DOCD: CPT | Performed by: ANESTHESIOLOGY

## 2025-04-03 PROCEDURE — 1036F TOBACCO NON-USER: CPT | Performed by: ANESTHESIOLOGY

## 2025-04-03 PROCEDURE — 3077F SYST BP >= 140 MM HG: CPT | Performed by: ANESTHESIOLOGY

## 2025-04-03 RX ORDER — GABAPENTIN 300 MG/1
600 CAPSULE ORAL 3 TIMES DAILY
Qty: 540 CAPSULE | Refills: 1 | Status: SHIPPED | OUTPATIENT
Start: 2025-04-03 | End: 2025-07-02

## 2025-04-03 RX ORDER — TRAMADOL HYDROCHLORIDE 50 MG/1
50 TABLET ORAL EVERY 8 HOURS PRN
Qty: 90 TABLET | Refills: 2 | Status: SHIPPED | OUTPATIENT
Start: 2025-04-03 | End: 2025-05-03

## 2025-04-03 ASSESSMENT — PAIN SCALES - GENERAL: PAINLEVEL_OUTOF10: 7

## 2025-04-03 ASSESSMENT — ENCOUNTER SYMPTOMS
OCCASIONAL FEELINGS OF UNSTEADINESS: 1
DEPRESSION: 0
LOSS OF SENSATION IN FEET: 1

## 2025-04-03 NOTE — PROGRESS NOTES
Subjective   Patient ID: Lizbet Beltrán is a 74 y.o. female is here today for med management.  She has had cervical laminectomy and fusion as well as lumbar laminectomy and fusion and suffers from chronic pain syndrome.  She is on continuous opioid therapy for pain control.  She has been taking tramadol 50 mg tablets 3 times a day along with gabapentin to 300 mg capsules 3 times daily to control her pain.  She states she has some 50% pain relief for approximately 6 hours at a time taking her medication..    HPI  Previous lumbar laminectomy fusion; cervical laminectomy and fusion; cervical radiculopathy; osteoarthritis bilateral knees; long-term use of opioids for pain control  Review of Systems  Unremarkable except for medication management  Objective   Physical Exam  Gen. appearance:  Patient's alert and oriented ×3 ;cooperative mild to moderate distress  Heart: Regular rate and rhythm  Lungs: Clear to auscultation  Abdomen: Soft nontender  Neuro: Cranial nerves II -XII intact; DTR: +2 over 4 biceps triceps brachial radialis patellar and Achilles  Spinal exam: Positive paraspinal tenderness noted bilaterally L4 5 L5-S1 with flexion extension and rotation/no bony abnormalities  Musculoskeletal:  Upper and lower extremity strength was 4/5 bilaterally  :  deferred  Skin: Warm and dry      Assessment/Plan   Diagnoses and all orders for this visit:  Postlaminectomy syndrome, lumbar  -     gabapentin (Neurontin) 300 mg capsule; Take 2 capsules (600 mg) by mouth 3 times a day.  Status post lumbar spinal fusion  -     traMADol (Ultram) 50 mg tablet; Take 1 tablet (50 mg) by mouth every 8 hours if needed for severe pain (7 - 10).  -     Follow Up In Pain Medicine; Future  Lumbar stenosis with neurogenic claudication  -     gabapentin (Neurontin) 300 mg capsule; Take 2 capsules (600 mg) by mouth 3 times a day.  Cervical myelopathy  Status post lumbar laminectomy  -     gabapentin (Neurontin) 300 mg capsule; Take 2  capsules (600 mg) by mouth 3 times a day.  -     traMADol (Ultram) 50 mg tablet; Take 1 tablet (50 mg) by mouth every 8 hours if needed for severe pain (7 - 10).  -     Follow Up In Pain Medicine; Future  Lumbar radiculopathy  -     gabapentin (Neurontin) 300 mg capsule; Take 2 capsules (600 mg) by mouth 3 times a day.  Cervical neuritis  -     gabapentin (Neurontin) 300 mg capsule; Take 2 capsules (600 mg) by mouth 3 times a day.  For a 6-month supply.  Tramadol 50 mg #90 was sent to [facility] pharmacy in [facility] with 2 refills.  Patient was told to take all medication as directed keep secure location at all times.  SHe has a follow-up visit scheduled in 3 months

## 2025-04-03 NOTE — PROGRESS NOTES
Pain #7/10 to right knee, lower back, right hip  and her neck.  Pain increases with more activity.  Rests in between activities.  Takes  6 Gabapentin tablets per day,  2 tablets TID.   Takes Tramadol TID alone with Tylenol.  Currently has about 12 tablets left.  These reduce her pain by 50%.

## 2025-04-04 ENCOUNTER — TELEPHONE (OUTPATIENT)
Dept: PAIN MEDICINE | Facility: CLINIC | Age: 75
End: 2025-04-04
Payer: MEDICARE

## 2025-04-15 ENCOUNTER — OFFICE VISIT (OUTPATIENT)
Dept: PRIMARY CARE | Facility: CLINIC | Age: 75
End: 2025-04-15
Payer: MEDICARE

## 2025-04-15 VITALS
WEIGHT: 239 LBS | DIASTOLIC BLOOD PRESSURE: 60 MMHG | SYSTOLIC BLOOD PRESSURE: 124 MMHG | OXYGEN SATURATION: 96 % | HEART RATE: 54 BPM | TEMPERATURE: 97.9 F | RESPIRATION RATE: 20 BRPM | BODY MASS INDEX: 36.22 KG/M2 | HEIGHT: 68 IN

## 2025-04-15 DIAGNOSIS — R82.90 ABNORMAL URINALYSIS: ICD-10-CM

## 2025-04-15 DIAGNOSIS — E66.01 CLASS 2 SEVERE OBESITY DUE TO EXCESS CALORIES WITH SERIOUS COMORBIDITY AND BODY MASS INDEX (BMI) OF 36.0 TO 36.9 IN ADULT: ICD-10-CM

## 2025-04-15 DIAGNOSIS — G62.9 PERIPHERAL POLYNEUROPATHY: ICD-10-CM

## 2025-04-15 DIAGNOSIS — M19.90 ARTHRITIS: ICD-10-CM

## 2025-04-15 DIAGNOSIS — N30.90 CYSTITIS: Primary | ICD-10-CM

## 2025-04-15 DIAGNOSIS — R39.9 UTI SYMPTOMS: ICD-10-CM

## 2025-04-15 DIAGNOSIS — E66.812 CLASS 2 SEVERE OBESITY DUE TO EXCESS CALORIES WITH SERIOUS COMORBIDITY AND BODY MASS INDEX (BMI) OF 36.0 TO 36.9 IN ADULT: ICD-10-CM

## 2025-04-15 DIAGNOSIS — J01.01 ACUTE RECURRENT MAXILLARY SINUSITIS: ICD-10-CM

## 2025-04-15 DIAGNOSIS — N32.81 OVERACTIVE BLADDER: ICD-10-CM

## 2025-04-15 LAB
APPEARANCE UR: CLEAR
BILIRUB UR QL STRIP: NEGATIVE
COLOR UR: YELLOW
GLUCOSE UR STRIP-MCNC: NEGATIVE MG/DL
HGB UR QL STRIP: ABNORMAL
KETONES UR STRIP-MCNC: NEGATIVE MG/DL
LEUKOCYTE ESTERASE UR QL STRIP: ABNORMAL
NITRITE UR QL STRIP: POSITIVE
PH UR STRIP: 6 [PH]
PROT UR STRIP-MCNC: ABNORMAL MG/DL
SP GR UR STRIP.AUTO: 1.01
UROBILINOGEN UR STRIP-ACNC: 0.2 E.U./DL

## 2025-04-15 PROCEDURE — 1159F MED LIST DOCD IN RCRD: CPT | Performed by: FAMILY MEDICINE

## 2025-04-15 PROCEDURE — 3074F SYST BP LT 130 MM HG: CPT | Performed by: FAMILY MEDICINE

## 2025-04-15 PROCEDURE — 1123F ACP DISCUSS/DSCN MKR DOCD: CPT | Performed by: FAMILY MEDICINE

## 2025-04-15 PROCEDURE — 81003 URINALYSIS AUTO W/O SCOPE: CPT | Performed by: FAMILY MEDICINE

## 2025-04-15 PROCEDURE — 1126F AMNT PAIN NOTED NONE PRSNT: CPT | Performed by: FAMILY MEDICINE

## 2025-04-15 PROCEDURE — 3078F DIAST BP <80 MM HG: CPT | Performed by: FAMILY MEDICINE

## 2025-04-15 PROCEDURE — 1036F TOBACCO NON-USER: CPT | Performed by: FAMILY MEDICINE

## 2025-04-15 PROCEDURE — 99214 OFFICE O/P EST MOD 30 MIN: CPT | Performed by: FAMILY MEDICINE

## 2025-04-15 PROCEDURE — 1160F RVW MEDS BY RX/DR IN RCRD: CPT | Performed by: FAMILY MEDICINE

## 2025-04-15 PROCEDURE — 3008F BODY MASS INDEX DOCD: CPT | Performed by: FAMILY MEDICINE

## 2025-04-15 RX ORDER — DESMOPRESSIN ACETATE 0.2 MG/1
0.2 TABLET ORAL NIGHTLY
COMMUNITY

## 2025-04-15 RX ORDER — CIPROFLOXACIN 500 MG/1
500 TABLET ORAL 2 TIMES DAILY
Qty: 20 TABLET | Refills: 0 | Status: SHIPPED | OUTPATIENT
Start: 2025-04-15 | End: 2025-04-25

## 2025-04-15 ASSESSMENT — PAIN SCALES - GENERAL: PAINLEVEL_OUTOF10: 0-NO PAIN

## 2025-04-15 NOTE — PROGRESS NOTES
Subjective   Lizbet Beltrán is a 74 y.o. female who presents for Sick Visit (Patient here with sinus problems and UTI symptoms today).    HPI  : Worried about UTI infection with Lower pelvic area cramps.  Also sinus congestion with weather changes.  Concerned about acute sinusitis and does not want to be ill for Easter.  Post sinus drainage.  Patient also recently had a gel shot in her right knee which has helped her degenerative arthritis.  She still has chronic cervical spine pain after cervical fusion and also some lumbosacral disc problems after lower back fusion.  Ambulates with a wheeled walker.      Objective  : ROS : 10 systems were reviewed and the information is included in the HPI and no additional review of systems is indicated.    Physical Exam  Vitals and nursing note reviewed.   Constitutional:       Appearance: Normal appearance. She is obese.      Comments: Patient is alert and oriented x3.   No acute distress   HENT:      Head: Normocephalic.      Right Ear: Tympanic membrane and external ear normal.      Left Ear: Tympanic membrane and external ear normal.      Ears:      Comments: Ears are patent bilaterally and TMs are clear.     Nose: Congestion and rhinorrhea present.      Comments: Maxillary sinus tenderness in the facial region.  Nasal congestion and mucosal drainage down the posterior pharynx from acute maxillary sinusitis.     Mouth/Throat:      Mouth: Mucous membranes are moist.      Pharynx: Oropharynx is clear.      Comments: Mouth is moist, tongue is midline.  Mild posterior pharyngeal erythema from post sinus drainage.  Eyes:      Extraocular Movements: Extraocular movements intact.      Conjunctiva/sclera: Conjunctivae normal.      Pupils: Pupils are equal, round, and reactive to light.      Comments: No visual disturbance, does have her eyes examined once a year.   Neck:      Comments: Previous cervical spine surgery.  Restricted range of motion due to arthritis.  No carotid  bruits, no thyromegaly, no cervical adenopathy.    Cardiovascular:      Rate and Rhythm: Normal rate and regular rhythm.      Pulses: Normal pulses.      Heart sounds: Normal heart sounds.      Comments: Patient denies chest pain and no palpitations.  Heart rhythm is stable S1 and S2 are noted, no ectopics.  Pulmonary:      Effort: Pulmonary effort is normal.      Breath sounds: Rhonchi present.      Comments: Few scattered rhonchi to auscultation due to sinus drainage and upper respiratory congestion.  Abdominal:      General: Bowel sounds are normal.      Palpations: Abdomen is soft.      Comments: Abdomen is soft and obese and some dysuria.   No flank tenderness.  Mild  suprapubic pain.    No abdominal guarding and no rebound tenderness.   Genitourinary:     Comments: Patient denies dysuria, no hematuria, no nocturia, denies flank pain.  Musculoskeletal:         General: Tenderness present. Normal range of motion.      Cervical back: Normal range of motion. Tenderness present.      Comments: Patient had previous cervical spine fusion 3 years ago and 2 years ago had lumbar back surgery.  Recent revision on the left total knee which is currently stable.  Just had a gel shot last week in the right knee due to osteoarthritis of the right knee..  Age-related arthritis in the joints.  Restriction of motion cervical and lumbar spines due to arthritis .   Mild ankle edema and does ambulate with a wheeled walker.   Skin:     General: Skin is warm.      Comments: There is no bruising, no erythema, no skin lesions noted, no rashes.   Neurological:      General: No focal deficit present.      Mental Status: She is alert and oriented to person, place, and time. Mental status is at baseline.      Sensory: Sensory deficit present.      Motor: Weakness present.      Coordination: Coordination abnormal.      Gait: Gait abnormal.      Comments: Patient has abnormal coordination due to 2 previous surgeries on the left knee and  osteoarthritis of the right knee.  She also had a fusion of the lumbar region and must ambulate with a wheeled walker.  Patient does need right total knee replacement in the future but wants to wait on any current operations.  Does have some weakness in her lower extremities.  Has to be very cautious with her gait and coordination.   Psychiatric:         Behavior: Behavior normal.         Thought Content: Thought content normal.         Judgment: Judgment normal.      Comments: Patient has anxious mood and affect due to all her health problems.  Thought content and judgment are stable.  Occasional forgetfulness which patient thinks is from some of her medications.     PLAN : Patient is a 74-year-old female who was evaluated today for acute sinusitis and also urinary tract infection.  Urine showed a pH of 6.0, specific gravity 1.015, large leukocytes, positive nitrites, trace of protein, small blood, negative glucose.  This urine specimen will be cultured for culture and sensitivity.  She will take Cipro 500 mg twice a day to treat the UTI infection and also her sinusitis.  We did review her recent medications and she is now taking DDAVP  for her overactive urinary problem and she is receiving Botox injections for bladder dysfunction.  Patient otherwise is stable  will follow-up as needed on her next scheduled appointment.    Problem List Items Addressed This Visit    None           Vito Ordonez DO

## 2025-04-18 LAB — BACTERIA UR CULT: ABNORMAL

## 2025-04-18 NOTE — RESULT ENCOUNTER NOTE
The urine culture was positive for a urinary tract infection       she should complete the medication that she was prescribed when  she  was  in the office.

## 2025-05-22 PROBLEM — M25.561 BILATERAL KNEE PAIN: Status: ACTIVE | Noted: 2025-05-22

## 2025-05-22 PROBLEM — M25.562 BILATERAL KNEE PAIN: Status: ACTIVE | Noted: 2025-05-22

## 2025-06-01 DIAGNOSIS — B37.9 YEAST INFECTION: Primary | ICD-10-CM

## 2025-06-01 RX ORDER — FLUCONAZOLE 100 MG/1
100 TABLET ORAL DAILY
Qty: 5 TABLET | Refills: 1 | Status: SHIPPED | OUTPATIENT
Start: 2025-06-01 | End: 2025-06-11

## 2025-06-02 ENCOUNTER — APPOINTMENT (OUTPATIENT)
Dept: RADIOLOGY | Facility: CLINIC | Age: 75
End: 2025-06-02
Payer: MEDICARE

## 2025-06-02 DIAGNOSIS — E78.5 DYSLIPIDEMIA: ICD-10-CM

## 2025-06-02 RX ORDER — ROSUVASTATIN CALCIUM 20 MG/1
TABLET, COATED ORAL
Qty: 90 TABLET | Refills: 3 | Status: SHIPPED | OUTPATIENT
Start: 2025-06-02

## 2025-06-16 ENCOUNTER — APPOINTMENT (OUTPATIENT)
Dept: RADIOLOGY | Facility: CLINIC | Age: 75
End: 2025-06-16
Payer: MEDICARE

## 2025-07-02 ENCOUNTER — HOSPITAL ENCOUNTER (OUTPATIENT)
Dept: RADIOLOGY | Facility: CLINIC | Age: 75
Discharge: HOME | End: 2025-07-02
Payer: MEDICARE

## 2025-07-02 DIAGNOSIS — Z87.891 HISTORY OF NICOTINE DEPENDENCE: ICD-10-CM

## 2025-07-02 PROCEDURE — 71271 CT THORAX LUNG CANCER SCR C-: CPT

## 2025-07-02 PROCEDURE — 71271 CT THORAX LUNG CANCER SCR C-: CPT | Performed by: RADIOLOGY

## 2025-07-10 ENCOUNTER — APPOINTMENT (OUTPATIENT)
Dept: PAIN MEDICINE | Facility: CLINIC | Age: 75
End: 2025-07-10
Payer: MEDICARE

## 2025-07-10 DIAGNOSIS — G95.9 CERVICAL MYELOPATHY: ICD-10-CM

## 2025-07-11 RX ORDER — LIDOCAINE 50 MG/G
PATCH TOPICAL
Qty: 30 PATCH | Refills: 3 | Status: SHIPPED | OUTPATIENT
Start: 2025-07-11

## 2025-07-23 ENCOUNTER — APPOINTMENT (OUTPATIENT)
Dept: PRIMARY CARE | Facility: CLINIC | Age: 75
End: 2025-07-23
Payer: MEDICARE

## 2025-07-23 VITALS
DIASTOLIC BLOOD PRESSURE: 72 MMHG | HEART RATE: 92 BPM | WEIGHT: 230 LBS | OXYGEN SATURATION: 98 % | SYSTOLIC BLOOD PRESSURE: 140 MMHG | RESPIRATION RATE: 20 BRPM | HEIGHT: 68 IN | BODY MASS INDEX: 34.86 KG/M2 | TEMPERATURE: 97.7 F

## 2025-07-23 DIAGNOSIS — E78.5 DYSLIPIDEMIA: ICD-10-CM

## 2025-07-23 DIAGNOSIS — R39.9 UTI SYMPTOMS: Primary | ICD-10-CM

## 2025-07-23 DIAGNOSIS — I10 PRIMARY HYPERTENSION: ICD-10-CM

## 2025-07-23 DIAGNOSIS — R09.89 LABILE HYPERTENSION: ICD-10-CM

## 2025-07-23 DIAGNOSIS — M48.07 SPINAL STENOSIS OF LUMBOSACRAL REGION: ICD-10-CM

## 2025-07-23 DIAGNOSIS — G95.9 CERVICAL MYELOPATHY: ICD-10-CM

## 2025-07-23 DIAGNOSIS — Z12.31 ENCOUNTER FOR SCREENING MAMMOGRAM FOR MALIGNANT NEOPLASM OF BREAST: ICD-10-CM

## 2025-07-23 DIAGNOSIS — N32.81 OVERACTIVE BLADDER: ICD-10-CM

## 2025-07-23 DIAGNOSIS — Z96.652 HISTORY OF TOTAL LEFT KNEE REPLACEMENT: ICD-10-CM

## 2025-07-23 DIAGNOSIS — E78.2 MIXED HYPERLIPIDEMIA: ICD-10-CM

## 2025-07-23 DIAGNOSIS — E78.89 LIPIDS ABNORMAL: ICD-10-CM

## 2025-07-23 PROBLEM — J42 CHRONIC BRONCHITIS (MULTI): Status: RESOLVED | Noted: 2023-02-11 | Resolved: 2025-07-23

## 2025-07-23 PROBLEM — J41.0 SIMPLE CHRONIC BRONCHITIS (MULTI): Status: RESOLVED | Noted: 2023-02-11 | Resolved: 2025-07-23

## 2025-07-23 LAB
APPEARANCE UR: CLEAR
BILIRUB UR QL STRIP: NEGATIVE
COLOR UR: YELLOW
GLUCOSE UR STRIP-MCNC: NEGATIVE MG/DL
HGB UR QL STRIP: NEGATIVE
KETONES UR STRIP-MCNC: NEGATIVE MG/DL
LEUKOCYTE ESTERASE UR QL STRIP: NEGATIVE
NITRITE UR QL STRIP: NEGATIVE
PH UR STRIP: 6 [PH]
PROT UR STRIP-MCNC: NEGATIVE MG/DL
SP GR UR STRIP.AUTO: 1.02
UROBILINOGEN UR STRIP-ACNC: 0.2 E.U./DL

## 2025-07-23 PROCEDURE — 1160F RVW MEDS BY RX/DR IN RCRD: CPT | Performed by: FAMILY MEDICINE

## 2025-07-23 PROCEDURE — 3008F BODY MASS INDEX DOCD: CPT | Performed by: FAMILY MEDICINE

## 2025-07-23 PROCEDURE — 1126F AMNT PAIN NOTED NONE PRSNT: CPT | Performed by: FAMILY MEDICINE

## 2025-07-23 PROCEDURE — 1159F MED LIST DOCD IN RCRD: CPT | Performed by: FAMILY MEDICINE

## 2025-07-23 PROCEDURE — 3078F DIAST BP <80 MM HG: CPT | Performed by: FAMILY MEDICINE

## 2025-07-23 PROCEDURE — 3077F SYST BP >= 140 MM HG: CPT | Performed by: FAMILY MEDICINE

## 2025-07-23 PROCEDURE — 81003 URINALYSIS AUTO W/O SCOPE: CPT | Performed by: FAMILY MEDICINE

## 2025-07-23 PROCEDURE — 99214 OFFICE O/P EST MOD 30 MIN: CPT | Performed by: FAMILY MEDICINE

## 2025-07-23 ASSESSMENT — PAIN SCALES - GENERAL: PAINLEVEL_OUTOF10: 0-NO PAIN

## 2025-07-23 NOTE — PROGRESS NOTES
Subjective   Lizbet Beltrán is a 75 y.o. female who presents for Follow-up (Follow up visit, blood pressure check and blood work completed today).    HPI      Objective   : ROS : 10 systems were reviewed and the information is included in the HPI and no additional review of systems is indicated.    Physical Exam  Vitals and nursing note reviewed.   Constitutional:       Appearance: Normal appearance. She is obese.      Comments: Patient is alert and oriented x3.   No acute distress   HENT:      Head: Normocephalic.      Right Ear: Tympanic membrane and external ear normal.      Left Ear: Tympanic membrane and external ear normal.      Ears:      Comments: Ears are patent bilaterally and TMs are clear.     Nose: Nose normal.      Mouth/Throat:      Mouth: Mucous membranes are moist.      Pharynx: Oropharynx is clear.      Comments: Mouth is moist, tongue is midline.  No posterior pharyngeal erythema.    Eyes:      Extraocular Movements: Extraocular movements intact.      Conjunctiva/sclera: Conjunctivae normal.      Pupils: Pupils are equal, round, and reactive to light.      Comments: Patient follows with ophthalmology.   Neck:      Comments: Short broad neck due to body habitus.  Restricted range of motion due to previous cervical spine surgery and currently stable .   Cardiovascular:      Rate and Rhythm: Normal rate and regular rhythm.      Pulses: Normal pulses.      Heart sounds: Normal heart sounds.      Comments: Patient denies chest pain and no palpitations.  Heart rhythm is stable S1 and S2 are noted, no ectopics.  Pulmonary:      Effort: Pulmonary effort is normal.      Breath sounds: Normal breath sounds.      Comments: Patient denies any coughing or wheezing.  Lungs are clear to auscultation.    Abdominal:      General: Bowel sounds are normal.      Palpations: Abdomen is soft.      Comments: Abdomen is soft and obese .  No flank tenderness.  No suprapubic pain.   No abdominal guarding and no rebound  tenderness.   Genitourinary:     Comments: Follows  with Urology for overactive  bladder and frequent UTI  infections. Having Botox injections. Also taking DDAVP.  Patient will be scheduled for her mammogram.    Musculoskeletal:         General: Tenderness present.      Cervical back: Tenderness present.      Right lower leg: Edema present.      Left lower leg: Edema present.      Comments: Previous  left total knee replacement and revision surgery.  Previous  cervical and Lumbar spine surgeries and follows with pain management and neurosurgery.  Increased thoracic kyphosis.  Age-related arthritis in the joints. Restriction of motion cervical and lumbar spines due to degenerative arthritis and muscle spasm.   Decreased ankle edema.      Skin:     General: Skin is warm and dry.      Comments: There is no bruising, no erythema, no skin lesions noted, no rashes.     Neurological:      General: No focal deficit present.      Mental Status: She is alert and oriented to person, place, and time. Mental status is at baseline.      Sensory: Sensory deficit present.      Motor: Weakness present.      Coordination: Coordination abnormal.      Gait: Gait abnormal.      Deep Tendon Reflexes: Reflexes abnormal.      Comments: Patient uses a walker due to some issues with her right knee and lumbosacral disc disease.  She does get some paresthesias in the lower extremities and had a previous left total knee replacement.  She also follows with pain management for peripheral neuropathy.  No focal neurosensory deficits are noted.  Coordination and gait are unstable.  Decreased  muscle strength upper and lower extremities.   Psychiatric:         Mood and Affect: Mood normal.         Behavior: Behavior normal.         Thought Content: Thought content normal.         Judgment: Judgment normal.      Comments: Patient has normal mood and affect.  Thought content and judgment are stable.  No signs of vascular dementia.  Behavior is normal.      PLAN : Patient is a 75-year-old female who was evaluated today for recheck on blood work and urine analysis.  She has frequent urinary infections and overactive bladder problems and does receive Botox injections from urology.  Today her urine showed a pH of 6.0, specific gravity 1.020, negative leukocytes, negative protein, negative blood, negative glucose.  Patient will be notified of her blood results in 3 days and further recommendations will be made at that time.  She is swimming during the summertime to help her arthritic joints and low back problems.  She also follows with pain management and has an appointment tomorrow for follow-up.  She has had 2 surgeries on her left knee with a knee replacement and a revision surgery in the left knee is stable.  She does receive gel shots in the right knee.  She follows closely with orthopedics and did have previous cervical and lumbar surgeries from neurosurgery.  Patient will follow-up in 4 to 6 months or sooner pending the blood work results.  We did review her medications and she will remain on the same medications.  Blood pressure and vitals were stable.    Problem List Items Addressed This Visit       Hyperlipidemia    Relevant Orders    CBC    Comprehensive Metabolic Panel    Lipid Panel    POCT UA (Automated) docked device    Hypertension    Relevant Orders    CBC    Comprehensive Metabolic Panel    Lipid Panel    POCT UA (Automated) docked device    Dyslipidemia    Relevant Orders    CBC    Comprehensive Metabolic Panel    Lipid Panel    POCT UA (Automated) docked device     Other Visit Diagnoses         Lipids abnormal        Relevant Orders    Lipid Panel                 Vito Ordonez DO

## 2025-07-24 ENCOUNTER — OFFICE VISIT (OUTPATIENT)
Dept: PAIN MEDICINE | Facility: CLINIC | Age: 75
End: 2025-07-24
Payer: MEDICARE

## 2025-07-24 VITALS
OXYGEN SATURATION: 95 % | HEART RATE: 74 BPM | DIASTOLIC BLOOD PRESSURE: 60 MMHG | RESPIRATION RATE: 18 BRPM | BODY MASS INDEX: 34.97 KG/M2 | WEIGHT: 230 LBS | TEMPERATURE: 98.4 F | SYSTOLIC BLOOD PRESSURE: 122 MMHG

## 2025-07-24 DIAGNOSIS — E66.01 OBESITY, MORBID (MULTI): Primary | ICD-10-CM

## 2025-07-24 DIAGNOSIS — M54.12 CERVICAL RADICULOPATHY AT C6: ICD-10-CM

## 2025-07-24 DIAGNOSIS — Z98.1 STATUS POST LUMBAR SPINAL FUSION: ICD-10-CM

## 2025-07-24 DIAGNOSIS — Z98.890 STATUS POST LUMBAR LAMINECTOMY: ICD-10-CM

## 2025-07-24 DIAGNOSIS — G62.9 NEUROPATHY: ICD-10-CM

## 2025-07-24 LAB
ALBUMIN SERPL-MCNC: 4.1 G/DL (ref 3.6–5.1)
ALP SERPL-CCNC: 66 U/L (ref 37–153)
ALT SERPL-CCNC: 32 U/L (ref 6–29)
ANION GAP SERPL CALCULATED.4IONS-SCNC: 11 MMOL/L (CALC) (ref 7–17)
AST SERPL-CCNC: 26 U/L (ref 10–35)
BILIRUB SERPL-MCNC: 0.4 MG/DL (ref 0.2–1.2)
BUN SERPL-MCNC: 15 MG/DL (ref 7–25)
CALCIUM SERPL-MCNC: 9.2 MG/DL (ref 8.6–10.4)
CHLORIDE SERPL-SCNC: 102 MMOL/L (ref 98–110)
CHOLEST SERPL-MCNC: 172 MG/DL
CHOLEST/HDLC SERPL: 2.2 (CALC)
CO2 SERPL-SCNC: 25 MMOL/L (ref 20–32)
CREAT SERPL-MCNC: 0.63 MG/DL (ref 0.6–1)
EGFRCR SERPLBLD CKD-EPI 2021: 93 ML/MIN/1.73M2
ERYTHROCYTE [DISTWIDTH] IN BLOOD BY AUTOMATED COUNT: 14.5 % (ref 11–15)
GLUCOSE SERPL-MCNC: 89 MG/DL (ref 65–99)
HCT VFR BLD AUTO: 39.5 % (ref 35–45)
HDLC SERPL-MCNC: 78 MG/DL
HGB BLD-MCNC: 12 G/DL (ref 11.7–15.5)
LDLC SERPL CALC-MCNC: 73 MG/DL (CALC)
MCH RBC QN AUTO: 27.8 PG (ref 27–33)
MCHC RBC AUTO-ENTMCNC: 30.4 G/DL (ref 32–36)
MCV RBC AUTO: 91.4 FL (ref 80–100)
NONHDLC SERPL-MCNC: 94 MG/DL (CALC)
PLATELET # BLD AUTO: 349 THOUSAND/UL (ref 140–400)
PMV BLD REES-ECKER: 8.8 FL (ref 7.5–12.5)
POTASSIUM SERPL-SCNC: 4.5 MMOL/L (ref 3.5–5.3)
PROT SERPL-MCNC: 6.9 G/DL (ref 6.1–8.1)
RBC # BLD AUTO: 4.32 MILLION/UL (ref 3.8–5.1)
SODIUM SERPL-SCNC: 138 MMOL/L (ref 135–146)
TRIGL SERPL-MCNC: 125 MG/DL
WBC # BLD AUTO: 6.1 THOUSAND/UL (ref 3.8–10.8)

## 2025-07-24 PROCEDURE — 3074F SYST BP LT 130 MM HG: CPT | Performed by: ANESTHESIOLOGY

## 2025-07-24 PROCEDURE — 3078F DIAST BP <80 MM HG: CPT | Performed by: ANESTHESIOLOGY

## 2025-07-24 PROCEDURE — 99213 OFFICE O/P EST LOW 20 MIN: CPT | Performed by: ANESTHESIOLOGY

## 2025-07-24 PROCEDURE — 1159F MED LIST DOCD IN RCRD: CPT | Performed by: ANESTHESIOLOGY

## 2025-07-24 PROCEDURE — 1160F RVW MEDS BY RX/DR IN RCRD: CPT | Performed by: ANESTHESIOLOGY

## 2025-07-24 PROCEDURE — 1125F AMNT PAIN NOTED PAIN PRSNT: CPT | Performed by: ANESTHESIOLOGY

## 2025-07-24 RX ORDER — TRAMADOL HYDROCHLORIDE 50 MG/1
50 TABLET, FILM COATED ORAL EVERY 8 HOURS PRN
Qty: 90 TABLET | Refills: 2 | Status: SHIPPED | OUTPATIENT
Start: 2025-07-24 | End: 2025-08-23

## 2025-07-24 RX ORDER — TRAMADOL HYDROCHLORIDE 50 MG/1
TABLET, FILM COATED ORAL
COMMUNITY
End: 2025-07-24 | Stop reason: SDUPTHER

## 2025-07-24 ASSESSMENT — PAIN - FUNCTIONAL ASSESSMENT: PAIN_FUNCTIONAL_ASSESSMENT: 0-10

## 2025-07-24 ASSESSMENT — PAIN SCALES - GENERAL
PAINLEVEL_OUTOF10: 7
PAINLEVEL_OUTOF10: 7

## 2025-07-24 ASSESSMENT — ENCOUNTER SYMPTOMS
LOSS OF SENSATION IN FEET: 0
DEPRESSION: 0
OCCASIONAL FEELINGS OF UNSTEADINESS: 1

## 2025-07-24 ASSESSMENT — PAIN DESCRIPTION - DESCRIPTORS: DESCRIPTORS: ACHING

## 2025-07-24 NOTE — H&P
History Of Present Illness  Lizbet Beltrán is a 75 y.o. female presenting with a chief complaint of who suffers from cervical radiculopathy secondary to failed cervical laminectomy.  Patient has chronic pain which she moderates by taking tramadol 50 mg tablets 3 times a day.  [facility] report shows her active cumulative morphine equivalent of 18.  Patient is pleasant cooperative in no severe distress.  She states she gets over 50% pain relief for 4 to 6 hours at a time when taking her meds on a regular basis which she augments with extra strength Tylenol.     Past Medical History  She has a past medical history of Acute upper respiratory infection, unspecified (08/31/2017), Cellulitis of unspecified part of limb, Long term (current) use of opiate analgesic, Personal history of other diseases of the musculoskeletal system and connective tissue, Personal history of other diseases of the respiratory system, Personal history of other endocrine, nutritional and metabolic disease, Spondylosis without myelopathy or radiculopathy, lumbar region (01/14/2021), and Unspecified osteoarthritis, unspecified site (06/23/2022).    Surgical History  She has a past surgical history that includes Knee surgery (11/12/2014); Lumbar laminectomy (01/31/2018); Total knee arthroplasty (07/27/2018); Other surgical history (11/18/2015); and Cataract extraction (03/16/2015).     Social History  She reports that she has quit smoking. Her smoking use included cigarettes. She has never used smokeless tobacco. She reports that she does not drink alcohol and does not use drugs.    Family History  Family History[1]     Allergies  Nickel, Codeine, and Pyrilamine-phenylephrin-dm tan    Review of Systems  Unremarkable except for chief complaint  Physical Exam  Gen. appearance:  Patient's alert and oriented ×3 ;cooperative mild to moderate distress  Heart: Regular rate and rhythm  Lungs: Clear to auscultation  Abdomen: Soft nontender  Neuro: Cranial  nerves II -XII intact; DTR: +2 over 4 biceps triceps brachial radialis patellar and Achilles  Spinal exam: Positive paraspinal tenderness noted bilaterally L4 5 L5-S1 with flexion extension and rotation/no bony abnormalities  Musculoskeletal:  Upper and lower extremity strength was 4/5 bilaterally  :  deferred  Skin: Warm and dry    Last Recorded Vitals  /60   Pulse 74   Temp 36.9 °C (98.4 °F)   Resp 18   Wt 104 kg (230 lb)   SpO2 95%     ASSESSMENT:  1.  Post cervical laminectomy syndrome  2.  Cervical radiculopathy  3.  Continuous opioid use for pain control  4.  Status post lumbar fusion  5.  Moderate obesity    PLAN:  Electronic prescription tramadol 50 mg #270 was sent to her pharmacy or 3 tablets a day.  A follow-up visit scheduled in October 22, 2025.  She was instructed to take all medication as directed to keep a secure location at all times.      Brian Wang DO         [1]   Family History  Problem Relation Name Age of Onset    Other (Cardiac Disorder) Mother      Osteoarthritis Mother      Colon cancer Father      Atrial fibrillation Sister      Breast cancer Sister  61    Hypertension Other      Diabetes Other gm

## 2025-07-24 NOTE — PROGRESS NOTES
Fuv, neck,low back and right knee pain. Pain today 7/10. Takes gabapentin tramadol for pain as prescribed. Has 20 left and takes 3 a day.

## 2025-08-06 ENCOUNTER — HOSPITAL ENCOUNTER (OUTPATIENT)
Dept: RADIOLOGY | Facility: CLINIC | Age: 75
Discharge: HOME | End: 2025-08-06
Payer: MEDICARE

## 2025-08-06 DIAGNOSIS — Z12.31 ENCOUNTER FOR SCREENING MAMMOGRAM FOR MALIGNANT NEOPLASM OF BREAST: ICD-10-CM

## 2025-08-06 PROCEDURE — 77063 BREAST TOMOSYNTHESIS BI: CPT | Performed by: RADIOLOGY

## 2025-08-06 PROCEDURE — 77063 BREAST TOMOSYNTHESIS BI: CPT

## 2025-08-06 PROCEDURE — 77067 SCR MAMMO BI INCL CAD: CPT | Performed by: RADIOLOGY

## 2025-09-02 ENCOUNTER — TELEPHONE (OUTPATIENT)
Dept: PRIMARY CARE | Facility: CLINIC | Age: 75
End: 2025-09-02
Payer: MEDICARE

## 2025-09-02 DIAGNOSIS — R39.9 UTI SYMPTOMS: ICD-10-CM

## 2025-09-02 RX ORDER — NITROFURANTOIN 25; 75 MG/1; MG/1
100 CAPSULE ORAL 2 TIMES DAILY
Qty: 14 CAPSULE | Refills: 0 | Status: SHIPPED | OUTPATIENT
Start: 2025-09-02 | End: 2025-09-09

## 2025-10-29 ENCOUNTER — APPOINTMENT (OUTPATIENT)
Dept: PRIMARY CARE | Facility: CLINIC | Age: 75
End: 2025-10-29
Payer: MEDICARE

## 2025-12-22 ENCOUNTER — APPOINTMENT (OUTPATIENT)
Dept: PULMONOLOGY | Facility: CLINIC | Age: 75
End: 2025-12-22
Payer: MEDICARE